# Patient Record
Sex: FEMALE | Race: WHITE | NOT HISPANIC OR LATINO | Employment: FULL TIME | URBAN - METROPOLITAN AREA
[De-identification: names, ages, dates, MRNs, and addresses within clinical notes are randomized per-mention and may not be internally consistent; named-entity substitution may affect disease eponyms.]

---

## 2018-01-17 ENCOUNTER — OFFICE VISIT CONVERTED (OUTPATIENT)
Dept: FAMILY MEDICINE CLINIC | Facility: CLINIC | Age: 38
End: 2018-01-17
Attending: NURSE PRACTITIONER

## 2019-02-04 ENCOUNTER — OFFICE VISIT CONVERTED (OUTPATIENT)
Dept: FAMILY MEDICINE CLINIC | Facility: CLINIC | Age: 39
End: 2019-02-04
Attending: NURSE PRACTITIONER

## 2020-07-01 ENCOUNTER — CONVERSION ENCOUNTER (OUTPATIENT)
Dept: FAMILY MEDICINE CLINIC | Facility: CLINIC | Age: 40
End: 2020-07-01

## 2020-07-01 ENCOUNTER — HOSPITAL ENCOUNTER (OUTPATIENT)
Dept: FAMILY MEDICINE CLINIC | Facility: CLINIC | Age: 40
Discharge: HOME | End: 2020-07-01
Attending: NURSE PRACTITIONER

## 2020-07-01 ENCOUNTER — OFFICE VISIT CONVERTED (OUTPATIENT)
Dept: FAMILY MEDICINE CLINIC | Facility: CLINIC | Age: 40
End: 2020-07-01
Attending: NURSE PRACTITIONER

## 2020-07-01 LAB
25(OH)D3 SERPL-MCNC: 32.1 NG/ML (ref 30–100)
ALBUMIN SERPL-MCNC: 4.6 G/DL (ref 3.5–5)
ALBUMIN/GLOB SERPL: 1.4 {RATIO} (ref 1.4–2.6)
ALP SERPL-CCNC: 60 U/L (ref 42–98)
ALT SERPL-CCNC: 12 U/L (ref 10–40)
ANION GAP SERPL CALC-SCNC: 21 MMOL/L (ref 8–19)
AST SERPL-CCNC: 19 U/L (ref 15–50)
BASOPHILS # BLD AUTO: 0.01 10*3/UL (ref 0–0.2)
BASOPHILS NFR BLD AUTO: 0.1 % (ref 0–3)
BILIRUB SERPL-MCNC: 0.66 MG/DL (ref 0.2–1.3)
BUN SERPL-MCNC: 12 MG/DL (ref 5–25)
BUN/CREAT SERPL: 14 {RATIO} (ref 6–20)
CALCIUM SERPL-MCNC: 9.7 MG/DL (ref 8.7–10.4)
CHLORIDE SERPL-SCNC: 103 MMOL/L (ref 99–111)
CONV ABS IMM GRAN: 0.01 10*3/UL (ref 0–0.2)
CONV CO2: 22 MMOL/L (ref 22–32)
CONV IMMATURE GRAN: 0.1 % (ref 0–1.8)
CONV TOTAL PROTEIN: 7.9 G/DL (ref 6.3–8.2)
CREAT UR-MCNC: 0.84 MG/DL (ref 0.5–0.9)
DEPRECATED RDW RBC AUTO: 44.4 FL (ref 36.4–46.3)
EOSINOPHIL # BLD AUTO: 0.09 10*3/UL (ref 0–0.7)
EOSINOPHIL # BLD AUTO: 1.2 % (ref 0–7)
ERYTHROCYTE [DISTWIDTH] IN BLOOD BY AUTOMATED COUNT: 12.7 % (ref 11.7–14.4)
FERRITIN SERPL-MCNC: 53 NG/ML (ref 10–200)
GFR SERPLBLD BASED ON 1.73 SQ M-ARVRAT: >60 ML/MIN/{1.73_M2}
GLOBULIN UR ELPH-MCNC: 3.3 G/DL (ref 2–3.5)
GLUCOSE SERPL-MCNC: 94 MG/DL (ref 65–99)
HCT VFR BLD AUTO: 43.8 % (ref 37–47)
HGB BLD-MCNC: 14 G/DL (ref 12–16)
IRON SATN MFR SERPL: 29 % (ref 20–55)
IRON SERPL-MCNC: 125 UG/DL (ref 60–170)
LYMPHOCYTES # BLD AUTO: 2.42 10*3/UL (ref 1–5)
LYMPHOCYTES NFR BLD AUTO: 32.9 % (ref 20–45)
MCH RBC QN AUTO: 30.4 PG (ref 27–31)
MCHC RBC AUTO-ENTMCNC: 32 G/DL (ref 33–37)
MCV RBC AUTO: 95 FL (ref 81–99)
MONOCYTES # BLD AUTO: 0.64 10*3/UL (ref 0.2–1.2)
MONOCYTES NFR BLD AUTO: 8.7 % (ref 3–10)
NEUTROPHILS # BLD AUTO: 4.19 10*3/UL (ref 2–8)
NEUTROPHILS NFR BLD AUTO: 57 % (ref 30–85)
NRBC CBCN: 0 % (ref 0–0.7)
OSMOLALITY SERPL CALC.SUM OF ELEC: 292 MOSM/KG (ref 273–304)
PLATELET # BLD AUTO: 308 10*3/UL (ref 130–400)
PMV BLD AUTO: 9.9 FL (ref 9.4–12.3)
POTASSIUM SERPL-SCNC: 4.5 MMOL/L (ref 3.5–5.3)
RBC # BLD AUTO: 4.61 10*6/UL (ref 4.2–5.4)
SODIUM SERPL-SCNC: 141 MMOL/L (ref 135–147)
T4 FREE SERPL-MCNC: 1.4 NG/DL (ref 0.9–1.8)
TIBC SERPL-MCNC: 425 UG/DL (ref 245–450)
TRANSFERRIN SERPL-MCNC: 297 MG/DL (ref 250–380)
TSH SERPL-ACNC: 2.04 M[IU]/L (ref 0.27–4.2)
WBC # BLD AUTO: 7.36 10*3/UL (ref 4.8–10.8)

## 2020-07-02 LAB
CONV ANTI MICROSOMAL AB: 20 IU/ML (ref 0–34)
FOLATE SERPL-MCNC: 15.2 NG/ML (ref 4.8–20)
THYROGLOBULIN ANTIBODY: <1 IU/ML (ref 0–0.9)
VIT B12 SERPL-MCNC: 412 PG/ML (ref 211–911)

## 2020-07-03 LAB
CONV EBV EARLY ANTIGEN: <5 U/ML (ref 0–10.9)
CONV EBV NUCLEAR ANTIGEN: 567 U/ML (ref 0–21.9)
CONV EPSTEIN BARR VIRAL CAPSID IGM: <10 U/ML (ref 0–43.9)
CONV EPSTEIN BARR VIRUS ANTIBODY TO VIRAL CAPSID IGG: 91.9 U/ML (ref 0–21.9)

## 2020-10-29 ENCOUNTER — OFFICE VISIT CONVERTED (OUTPATIENT)
Dept: FAMILY MEDICINE CLINIC | Facility: CLINIC | Age: 40
End: 2020-10-29
Attending: NURSE PRACTITIONER

## 2020-10-29 ENCOUNTER — CONVERSION ENCOUNTER (OUTPATIENT)
Dept: FAMILY MEDICINE CLINIC | Facility: CLINIC | Age: 40
End: 2020-10-29

## 2021-05-09 VITALS
BODY MASS INDEX: 28.09 KG/M2 | WEIGHT: 179 LBS | OXYGEN SATURATION: 98 % | HEIGHT: 67 IN | TEMPERATURE: 97.7 F | DIASTOLIC BLOOD PRESSURE: 80 MMHG | HEART RATE: 112 BPM | SYSTOLIC BLOOD PRESSURE: 120 MMHG

## 2021-05-09 VITALS
DIASTOLIC BLOOD PRESSURE: 72 MMHG | OXYGEN SATURATION: 100 % | WEIGHT: 181.25 LBS | HEART RATE: 87 BPM | BODY MASS INDEX: 28.45 KG/M2 | SYSTOLIC BLOOD PRESSURE: 106 MMHG | HEIGHT: 67 IN | TEMPERATURE: 97.1 F

## 2021-05-09 VITALS
HEART RATE: 80 BPM | OXYGEN SATURATION: 99 % | WEIGHT: 172.5 LBS | BODY MASS INDEX: 27.07 KG/M2 | DIASTOLIC BLOOD PRESSURE: 68 MMHG | TEMPERATURE: 98.4 F | SYSTOLIC BLOOD PRESSURE: 110 MMHG | HEIGHT: 67 IN

## 2021-05-09 VITALS
TEMPERATURE: 98.2 F | HEART RATE: 87 BPM | OXYGEN SATURATION: 98 % | SYSTOLIC BLOOD PRESSURE: 110 MMHG | DIASTOLIC BLOOD PRESSURE: 70 MMHG | BODY MASS INDEX: 28.13 KG/M2 | WEIGHT: 179.25 LBS | HEIGHT: 67 IN

## 2023-02-22 ENCOUNTER — OFFICE VISIT (OUTPATIENT)
Dept: OBSTETRICS AND GYNECOLOGY | Facility: CLINIC | Age: 43
End: 2023-02-22
Payer: COMMERCIAL

## 2023-02-22 VITALS
HEIGHT: 67 IN | HEART RATE: 73 BPM | SYSTOLIC BLOOD PRESSURE: 130 MMHG | WEIGHT: 197 LBS | BODY MASS INDEX: 30.92 KG/M2 | DIASTOLIC BLOOD PRESSURE: 65 MMHG

## 2023-02-22 DIAGNOSIS — Z01.419 ENCOUNTER FOR GYNECOLOGICAL EXAMINATION WITHOUT ABNORMAL FINDING: Primary | ICD-10-CM

## 2023-02-22 PROCEDURE — G0123 SCREEN CERV/VAG THIN LAYER: HCPCS | Performed by: OBSTETRICS & GYNECOLOGY

## 2023-02-22 PROCEDURE — 99386 PREV VISIT NEW AGE 40-64: CPT | Performed by: OBSTETRICS & GYNECOLOGY

## 2023-02-22 NOTE — PROGRESS NOTES
"Well Woman Visit    CC: Annual well woman exam       HPI:   43 y.o. Contraception or HRT: Contraception:  Vasectomy   Menses:   q mon, lasts 6 days, 1 heavy day  Pain:  None  Incontinence concerns: No  Hx of abnormal pap:  No  Pt has no complaints today.      History: PMHx, Meds, Allergies, PSHx, Social Hx, and POBHx all reviewed and updated.      PHYSICAL EXAM:  /65   Pulse 73   Ht 170.2 cm (67\")   Wt 89.4 kg (197 lb)   LMP 2023   BMI 30.85 kg/m²   General- NAD, alert and oriented, appropriate  Psych- Normal mood, good memory  Neck- No masses, no thyroid enlargement  CV- Regular rhythm, no murnurs  Resp- CTA to bases, no wheezes  Abdomen- Soft, non distended, non tender, no masses    Breast left-  Bilaterally symmetrical, no masses, non tender, no nipple discharge  Breast right- Bilaterally symmetrical, no masses, non tender, no nipple discharge    External genitalia- Normal female, no lesions  Urethra/meatus- Normal, no masses, non tender, no prolapse  Bladder- Normal, no masses, non tender, no prolapse  Vagina- Normal, no atrophy, no lesions, no discharge, no prolapse  Cvx- Normal, no lesions, no discharge, No cervical motion tenderness  Uterus- Normal size, shape & consistency.  Non tender, mobile, & no prolapse  Adnexa- No mass, non tender  Anus/Rectum/Perineum- Not performed    Lymphatic- No palpable neck, axillary, or groin nodes  Ext- No edema, no cyanosis    Skin- No lesions, no rashes, no acanthosis nigricans        ASSESSMENT and PLAN:  WWE    Diagnoses and all orders for this visit:    1. Encounter for gynecological examination without abnormal finding (Primary)  -     Mammo Screening Digital Tomosynthesis Bilateral With CAD; Future  -     IGP,rfx Aptima HPV All Pth        Counseling:     Track menses, RTO IF <q21d, >7d long, or heavy    Domestic violence/abuse screen: negative    Depression screen: no SI    Preventative:   BREAST HEALTH- Monthly self breast exam importance and " how to reviewed. MMG and/or MRI (prn) reviewed per society guidelines and her individual history. Mammo/MRI screen: Updated today.  CERVICAL CANCER Screening- Reviewed current ASCCP guidelines for screening w and wo cotest HPV, age specific.  Screen: Updated today.  COLON CANCER Screening- Reviewed current medical society guidelines and options.  Colonoscopy screen:  Not medically needed.  SEXUAL HEALTH: Declines STD screening.  VACCINATIONS Recommended: Flu annually.  Importance discussed, risk being unvaccinated reviewed.  Questions answered  Smoking status- NON SMOKER.  Importance of avoiding second hand smoke.  Follow up PCP/Specialist PMHx and Labs  Myriad: Does not qualify.      She understands the importance of having any ordered tests to be performed in a timely fashion.  She is encouraged to review her results online and/or contact or office if she has questions.     Follow Up:  Return if symptoms worsen or fail to improve.      Mya Mcknight, APRN  02/22/2023

## 2023-03-01 LAB
CONV .: NORMAL
CYTOLOGIST CVX/VAG CYTO: NORMAL
CYTOLOGY CVX/VAG DOC CYTO: NORMAL
CYTOLOGY CVX/VAG DOC THIN PREP: NORMAL
DX ICD CODE: NORMAL
HIV 1 & 2 AB SER-IMP: NORMAL
OTHER STN SPEC: NORMAL
STAT OF ADQ CVX/VAG CYTO-IMP: NORMAL

## 2024-06-27 ENCOUNTER — TELEPHONE (OUTPATIENT)
Dept: OBSTETRICS AND GYNECOLOGY | Facility: CLINIC | Age: 44
End: 2024-06-27

## 2024-06-27 NOTE — TELEPHONE ENCOUNTER
Pt is wanting to see Dr. Cross, bc she has seen her in the past. She became a new pt to dori wright in 2023. I am checking in to this, so once I get an answer I will call pt.

## 2024-06-27 NOTE — TELEPHONE ENCOUNTER
Caller: Latisha Vogt    Relationship: Self    Best call back number: [phone]    Who is your current provider: QASIM LANZA    Is your current provider offboarding? NO    Who would you like your new provider to be:     What are your reasons for transferring care: STATED SHE WAS A PREVIOUS PT OF  AND WOULD LIKE TO SEEN HER AGAIN.     PT WOULD LIKE TO SCHEDULE FOR AN ANNUAL

## 2024-09-02 NOTE — PROGRESS NOTES
"Well Woman Visit    CC: Scheduled annual well gyn visit  Chief Complaint   Patient presents with    Gynecologic Exam       HPI:   44 y.o.   Social History     Substance and Sexual Activity   Sexual Activity Yes    Partners: Male    Birth control/protection: Vasectomy     IGP,rfx Aptima HPV All Pth (2023 14:42) Pap only negative    2nd day of menses really heavy w clots and bad mood swings, vitality provider noted iron was low    Menses:   q mo, lasts 6 days, changes products q 30min to 1hrs on heaviest days.   Pain with menses:  Cramping low back, occas OTC meds needed and helps    Lifting and jumping, leaks urine.  No constant leak.  Urgency and some trickling.   No hesitancy.     PCP: no recent preventative labs  History: PMHx, Meds, Allergies, PSHx, Social Hx, and POBHx all reviewed and updated.    PHYSICAL EXAM:  /76   Pulse 77   Ht 170.2 cm (67\")   Wt 77.1 kg (170 lb)   LMP 2024   BMI 26.63 kg/m²  Not found.   General- NAD, alert and oriented, appropriate  Psych- Normal mood, good memory  Neck- No masses, no thyroid enlargement  CV- Regular rhythm, no murmurs  Resp- CTA to bases, no wheezes  Abdomen- Soft, non distended, non tender, no masses    Chaperone present during breast and/or pelvic exam if performed.  Breast left-  Bilaterally symmetrical, no masses, non tender, no nipple discharge, no axillary or supraclavicular nodes palpable.    Breast right- Bilaterally symmetrical, no masses, non tender, no nipple discharge, no axillary or supraclavicular nodes palpable.      External genitalia- Normal female, no lesions  Urethra/meatus- Normal, no masses, non tender  Bladder- Normal, no masses, non tender  Vagina- Normal, no atrophy, no lesions, no discharge.    Prolapse : none noted, not examined with split speculum to delineate  Cvx- Normal, no lesions, no discharge, No cervical motion tenderness  Uterus- Normal size, shape & consistency.  Non tender, mobile.  Adnexa- No mass, non " tender  Anus/Rectum/Perineum- Normal appearance, no mass, good sphincter tone, no hemorrhoids, no prolapse    Lymphatic- No palpable neck, axillary, or groin nodes  Ext- No edema, no cyanosis    Skin- No lesions, no rashes, no acanthosis nigricans      ASSESSMENT and PLAN:    Diagnoses and all orders for this visit:    1. Well woman exam (Primary)  -     Mammo Screening Digital Tomosynthesis Bilateral With CAD; Future  -     TSH  -     Hemoglobin A1c  -     Lipid Panel    2. Menorrhagia with regular cycle  Assessment & Plan:  We discussed potential differential diagnoses not limited to malignancy.  I would recommend evaluation workup and endometrial biopsy.  Questions answered.    Orders:  -     TSH  -     T4, Free  -     CBC (No Diff)  -     US Non-ob Transvaginal; Future    3. ALEXANDRU (stress urinary incontinence, female)  Assessment & Plan:  We discussed potential treatment options for stress urinary incontinence to include home Kegel exercises, pelvic floor physical therapy, pessary, urogynecology referral.  We also reviewed over-the-counter options like impress.  I reviewed the pessary traded day.  Recommend Kegel exercises and consider pelvic floor physical therapy.  Option for pessary fitting.  She will call if she desires.    Orders:  -     Urine Culture - Urine, Urine, Clean Catch  -     Urinalysis With Microscopic - Urine, Clean Catch    4. Urinary urgency  -     Urine Culture - Urine, Urine, Clean Catch  -     Urinalysis With Microscopic - Urine, Clean Catch  -     tolterodine LA (Detrol LA) 2 MG 24 hr capsule; Take 1 capsule by mouth Daily.  Dispense: 30 capsule; Refill: 12    5. Colon cancer screening  -     Ambulatory Referral to Gastroenterology      Preventative:  BREAST HEALTH- Monthly self breast exam importance and how to reviewed. MMG and/or MRI (prn) reviewed per society guidelines and her individual history. Screen: Updated today  CERVICAL CANCER Screening- Reviewed current ASCCP guidelines for  screening w and wo cotest HPV, age specific.  Screen: Already up to date  COLON CANCER Screening- Reviewed current medical society guidelines and options.  Screen:  Updated today  Importance of WEIGHT MANAGEMENT, nutrition, and exercise reviewed.  Ideal BMI discussed  BONE HEALTH- Reviewed current medical society guidelines and options for testing, calcium and vit D intake.  Weight bearing exercise.  DEXA: Not medically needed  VACCINATIONS Recommended: Covid vaccine, Flu annually.  Importance discussed, risk being unvaccinated reviewed.  Questions answered  Smoking status- NON SMOKER/VAPER          She understands the importance of having any ordered tests to be performed in a timely fashion.  The risks of not performing them include, but are not limited to, advanced cancer stages, bone loss from osteoporosis and/or subsequent increase in morbidity and/or mortality.  She is encouraged to review her results online and/or contact or office if she has questions.     Follow Up:  Return in about 4 weeks (around 10/1/2024) for FU US for EMBx.            Ceci Cross,   09/03/2024    [facility]  [facility] [place] [address]  Dept: [phone]  Dept Fax: [phone]  Loc: [phone]  Loc Fax: [phone]

## 2024-09-03 ENCOUNTER — OFFICE VISIT (OUTPATIENT)
Dept: OBSTETRICS AND GYNECOLOGY | Facility: CLINIC | Age: 44
End: 2024-09-03
Payer: OTHER GOVERNMENT

## 2024-09-03 VITALS
HEIGHT: 67 IN | SYSTOLIC BLOOD PRESSURE: 108 MMHG | WEIGHT: 170 LBS | BODY MASS INDEX: 26.68 KG/M2 | HEART RATE: 77 BPM | DIASTOLIC BLOOD PRESSURE: 76 MMHG

## 2024-09-03 DIAGNOSIS — Z12.11 COLON CANCER SCREENING: ICD-10-CM

## 2024-09-03 DIAGNOSIS — N92.0 MENORRHAGIA WITH REGULAR CYCLE: ICD-10-CM

## 2024-09-03 DIAGNOSIS — R39.15 URINARY URGENCY: ICD-10-CM

## 2024-09-03 DIAGNOSIS — Z01.419 WELL WOMAN EXAM: Primary | ICD-10-CM

## 2024-09-03 DIAGNOSIS — N39.3 SUI (STRESS URINARY INCONTINENCE, FEMALE): ICD-10-CM

## 2024-09-03 LAB
BACTERIA UR QL AUTO: ABNORMAL /HPF
BILIRUB UR QL STRIP: NEGATIVE
CHOLEST SERPL-MCNC: 195 MG/DL (ref 0–200)
CLARITY UR: CLEAR
COLOR UR: YELLOW
DEPRECATED RDW RBC AUTO: 41.7 FL (ref 37–54)
ERYTHROCYTE [DISTWIDTH] IN BLOOD BY AUTOMATED COUNT: 12.1 % (ref 12.3–15.4)
GLUCOSE UR STRIP-MCNC: NEGATIVE MG/DL
HBA1C MFR BLD: 5.2 % (ref 4.8–5.6)
HCT VFR BLD AUTO: 43.4 % (ref 34–46.6)
HDLC SERPL-MCNC: 52 MG/DL (ref 40–60)
HGB BLD-MCNC: 14.5 G/DL (ref 12–15.9)
HGB UR QL STRIP.AUTO: NEGATIVE
HYALINE CASTS UR QL AUTO: ABNORMAL /LPF
KETONES UR QL STRIP: NEGATIVE
LDLC SERPL CALC-MCNC: 124 MG/DL (ref 0–100)
LDLC/HDLC SERPL: 2.33 {RATIO}
LEUKOCYTE ESTERASE UR QL STRIP.AUTO: ABNORMAL
MCH RBC QN AUTO: 31.7 PG (ref 26.6–33)
MCHC RBC AUTO-ENTMCNC: 33.4 G/DL (ref 31.5–35.7)
MCV RBC AUTO: 95 FL (ref 79–97)
NITRITE UR QL STRIP: POSITIVE
PH UR STRIP.AUTO: 6 [PH] (ref 5–8)
PLATELET # BLD AUTO: 330 10*3/MM3 (ref 140–450)
PMV BLD AUTO: 9.4 FL (ref 6–12)
PROT UR QL STRIP: NEGATIVE
RBC # BLD AUTO: 4.57 10*6/MM3 (ref 3.77–5.28)
RBC # UR STRIP: ABNORMAL /HPF
REF LAB TEST METHOD: ABNORMAL
SP GR UR STRIP: 1.01 (ref 1–1.03)
SQUAMOUS #/AREA URNS HPF: ABNORMAL /HPF
T4 FREE SERPL-MCNC: 1.52 NG/DL (ref 0.92–1.68)
TRIGL SERPL-MCNC: 108 MG/DL (ref 0–150)
TSH SERPL DL<=0.05 MIU/L-ACNC: 2.22 UIU/ML (ref 0.27–4.2)
UROBILINOGEN UR QL STRIP: ABNORMAL
VLDLC SERPL-MCNC: 19 MG/DL (ref 5–40)
WBC # UR STRIP: ABNORMAL /HPF
WBC NRBC COR # BLD AUTO: 8.77 10*3/MM3 (ref 3.4–10.8)

## 2024-09-03 PROCEDURE — 83036 HEMOGLOBIN GLYCOSYLATED A1C: CPT | Performed by: OBSTETRICS & GYNECOLOGY

## 2024-09-03 PROCEDURE — 84443 ASSAY THYROID STIM HORMONE: CPT | Performed by: OBSTETRICS & GYNECOLOGY

## 2024-09-03 PROCEDURE — 84439 ASSAY OF FREE THYROXINE: CPT | Performed by: OBSTETRICS & GYNECOLOGY

## 2024-09-03 PROCEDURE — 81001 URINALYSIS AUTO W/SCOPE: CPT | Performed by: OBSTETRICS & GYNECOLOGY

## 2024-09-03 PROCEDURE — 80061 LIPID PANEL: CPT | Performed by: OBSTETRICS & GYNECOLOGY

## 2024-09-03 PROCEDURE — 99396 PREV VISIT EST AGE 40-64: CPT | Performed by: OBSTETRICS & GYNECOLOGY

## 2024-09-03 PROCEDURE — 87088 URINE BACTERIA CULTURE: CPT | Performed by: OBSTETRICS & GYNECOLOGY

## 2024-09-03 PROCEDURE — 87086 URINE CULTURE/COLONY COUNT: CPT | Performed by: OBSTETRICS & GYNECOLOGY

## 2024-09-03 PROCEDURE — 85027 COMPLETE CBC AUTOMATED: CPT | Performed by: OBSTETRICS & GYNECOLOGY

## 2024-09-03 PROCEDURE — 99214 OFFICE O/P EST MOD 30 MIN: CPT | Performed by: OBSTETRICS & GYNECOLOGY

## 2024-09-03 PROCEDURE — 36415 COLL VENOUS BLD VENIPUNCTURE: CPT | Performed by: OBSTETRICS & GYNECOLOGY

## 2024-09-03 PROCEDURE — 87186 SC STD MICRODIL/AGAR DIL: CPT | Performed by: OBSTETRICS & GYNECOLOGY

## 2024-09-03 RX ORDER — SEMAGLUTIDE 2.4 MG/.75ML
INJECTION, SOLUTION SUBCUTANEOUS
COMMUNITY
Start: 2024-08-31

## 2024-09-03 RX ORDER — TOLTERODINE 2 MG/1
2 CAPSULE, EXTENDED RELEASE ORAL DAILY
Qty: 30 CAPSULE | Refills: 12 | Status: SHIPPED | OUTPATIENT
Start: 2024-09-03

## 2024-09-03 NOTE — ASSESSMENT & PLAN NOTE
We discussed potential treatment options for stress urinary incontinence to include home Kegel exercises, pelvic floor physical therapy, pessary, urogynecology referral.  We also reviewed over-the-counter options like impress.  I reviewed the pessary traded day.  Recommend Kegel exercises and consider pelvic floor physical therapy.  Option for pessary fitting.  She will call if she desires.

## 2024-09-03 NOTE — ASSESSMENT & PLAN NOTE
We discussed potential differential diagnoses not limited to malignancy.  I would recommend evaluation workup and endometrial biopsy.  Questions answered.

## 2024-09-03 NOTE — PATIENT INSTRUCTIONS
Venipuncture Blood Specimen Collection  Venipuncture performed in right arm by Etta Casas with good hemostasis. Patient tolerated the procedure well without complications.   09/03/24   Etta Casas

## 2024-09-04 ENCOUNTER — TELEPHONE (OUTPATIENT)
Dept: OBSTETRICS AND GYNECOLOGY | Facility: CLINIC | Age: 44
End: 2024-09-04
Payer: OTHER GOVERNMENT

## 2024-09-04 NOTE — TELEPHONE ENCOUNTER
LM for patient to return call to discuss results.    ----- Message from Ceci Cross sent at 9/4/2024  8:49 AM EDT -----  Abnormal lipid panel.  Mildly elevated bad cholesterol (LDL).  Non fasting status only affects triglycerides.  I recommend exercise and continue to maintain normal weight, low fat and low carb diet.  We can re- check year.       Urine shows possible UTI.   Culture will be resulted in the next 1-2days and we will contact her if it confirms UTI.

## 2024-09-05 LAB — BACTERIA SPEC AEROBE CULT: ABNORMAL

## 2024-09-05 RX ORDER — NITROFURANTOIN 25; 75 MG/1; MG/1
100 CAPSULE ORAL 2 TIMES DAILY
Qty: 14 CAPSULE | Refills: 0 | Status: SHIPPED | OUTPATIENT
Start: 2024-09-05

## 2024-09-06 ENCOUNTER — TELEPHONE (OUTPATIENT)
Dept: OBSTETRICS AND GYNECOLOGY | Facility: CLINIC | Age: 44
End: 2024-09-06
Payer: OTHER GOVERNMENT

## 2024-09-06 NOTE — TELEPHONE ENCOUNTER
----- Message from Ceci Cross sent at 9/5/2024  4:58 PM EDT -----  Urine culture is consistent with infection.  I have sent in a prescription for Macrobid.  Sometimes infection can cause urinary urgency and leakage.  Lets treat the infection first and if she still has the urinary leakage and urgency she can start the Detrol as I prescribed at last office visit.  Have her start antibiotics as soon as she picks them up and finish the entire course.

## 2024-10-02 ENCOUNTER — PREP FOR SURGERY (OUTPATIENT)
Dept: OTHER | Facility: HOSPITAL | Age: 44
End: 2024-10-02
Payer: OTHER GOVERNMENT

## 2024-10-02 ENCOUNTER — CLINICAL SUPPORT (OUTPATIENT)
Dept: GASTROENTEROLOGY | Facility: CLINIC | Age: 44
End: 2024-10-02
Payer: OTHER GOVERNMENT

## 2024-10-02 DIAGNOSIS — Z12.11 COLON CANCER SCREENING: Primary | ICD-10-CM

## 2024-10-02 RX ORDER — SODIUM PICOSULFATE, MAGNESIUM OXIDE, AND ANHYDROUS CITRIC ACID 12; 3.5; 1 G/175ML; G/175ML; MG/175ML
175 LIQUID ORAL TAKE AS DIRECTED
Qty: 350 ML | Refills: 0 | Status: SHIPPED | OUTPATIENT
Start: 2024-10-02

## 2024-10-02 NOTE — PROGRESS NOTES
Latisha Vogt  1980  44 y.o.    Reason for call: Screening Colonoscopy  Prep prescribed: Clenpiq  Prep instructions reviewed with patient and sent to patient via regular mail to the home address on file  Is the patient currently on any injectable or oral medications for weight loss or diabetes? Yes  Clearance needed? No  If yes, what clearance is needed? N/A  Clearance has been requested from N/A  The patient has been scheduled for: Colonoscopy  After your procedure, you will be contacted with results. Please confirm the best phone # to reach the patient: [phone]  Family history of colon cancer? Yes  If yes, indicate relative: GRANDMOTHER  Tentative Procedure Date: 12/30/2024    Family History   Problem Relation Age of Onset    Deep vein thrombosis Paternal Grandmother     Colon cancer Paternal Grandmother     Breast cancer Neg Hx     Ovarian cancer Neg Hx     Uterine cancer Neg Hx     Melanoma Neg Hx     Pulmonary embolism Neg Hx      Past Medical History:   Diagnosis Date    Abnormal Pap smear of cervix     all normal at [facility] since 2013.  Pt unsure as to when had abn pap.    Disease of thyroid gland     Urogenital trichomoniasis      No Known Allergies  History reviewed. No pertinent surgical history.  Social History     Socioeconomic History    Marital status:    Tobacco Use    Smoking status: Never     Passive exposure: Never    Smokeless tobacco: Never   Vaping Use    Vaping status: Never Used   Substance and Sexual Activity    Alcohol use: Not Currently     Comment: SOCIALLY    Drug use: Never    Sexual activity: Yes     Partners: Male     Birth control/protection: Vasectomy       Current Outpatient Medications:     tolterodine LA (Detrol LA) 2 MG 24 hr capsule, Take 1 capsule by mouth Daily., Disp: 30 capsule, Rfl: 12    Wegovy 2.4 MG/0.75ML solution auto-injector, , Disp: , Rfl:     nitrofurantoin, macrocrystal-monohydrate, (Macrobid) 100 MG capsule, Take 1 capsule by mouth 2 (Two) Times a  Day. (Patient not taking: Reported on 10/2/2024), Disp: 14 capsule, Rfl: 0

## 2024-12-19 NOTE — PRE-PROCEDURE INSTRUCTIONS
"PAT call attempted. No answer.Detailed message with date and arrival time of 0630 given. Come to entrance \"C\", must have adult  for transportation home;may have two visitors;however, children under 12 must remain in waiting area.Instructed on diet/clear liquids/NPO bowel prep,if needed may take normal meds two hours prior to arrival time except for blood thinners, antidiabetics,diurectics,and weight loss meds.Instructed to return call to confirm receipt of instructions and for any questions.  "

## 2024-12-23 ENCOUNTER — OFFICE VISIT (OUTPATIENT)
Dept: FAMILY MEDICINE CLINIC | Facility: CLINIC | Age: 44
End: 2024-12-23
Payer: OTHER GOVERNMENT

## 2024-12-23 VITALS
HEIGHT: 67 IN | HEART RATE: 105 BPM | BODY MASS INDEX: 27.56 KG/M2 | DIASTOLIC BLOOD PRESSURE: 78 MMHG | TEMPERATURE: 98.1 F | SYSTOLIC BLOOD PRESSURE: 104 MMHG | OXYGEN SATURATION: 100 % | WEIGHT: 175.6 LBS

## 2024-12-23 DIAGNOSIS — E66.3 OVERWEIGHT (BMI 25.0-29.9): Primary | ICD-10-CM

## 2024-12-23 NOTE — PROGRESS NOTES
Chief Complaint  Establish Care (Weight loss )    PHQ-2 Total Score:    PHQ-9 Total Score:      History of Present Illness  Latisha Vogt is a 44 y.o. female who presents to [facility] with a past medical history of  Past Medical History:   Diagnosis Date    Abnormal Pap smear of cervix     all normal at [facility] since 2013.  Pt unsure as to when had abn pap.    Disease of thyroid gland     Urogenital trichomoniasis        HPI     The patient is a 44-year-old female who presents for weight management.    She has been on Wegovy since 06/01/2023, resulting in a weight loss of approximately 30 pounds, equivalent to 15 percent of her initial weight. However, her weight loss plateaued at around 167 pounds for several months. Over the past 1.5 months, she has experienced a weight gain of at least 5 pounds, which she attributes to an increased appetite and lack of satiety after meals. She suspects that her body may have developed a tolerance to Wegovy. Despite exercising three times a week, she acknowledges a deficiency in her protein intake, consuming only about 60 grams per day instead of the recommended 100 to 120 grams. She does not monitor her protein intake using any tracking apps. She has noticed a decrease in her strength during weightlifting sessions, although she still considers her lifting capacity to be satisfactory. She has no history of thyroid cancer or gastroparesis. A previous attempt to switch her medication to Zepbound was unsuccessful due to a denial of prior authorization, as her BMI was below 30.    She is scheduled for a colonoscopy next Monday, as recommended by her OB-GYN due to her age. She has never undergone a mammogram.    Depression screening was negative.    MEDICATIONS  Current: Wegovy       Objective   Vital Signs:   Vitals:    12/23/24 1434   BP: 104/78   BP Location: Left arm   Patient Position: Sitting   Pulse: 105   Temp: 98.1 °F (36.7 °C)   SpO2: 100%  "  Weight: 79.7 kg (175 lb 9.6 oz)   Height: 168.9 cm (66.5\")     Body mass index is 27.92 kg/m².    Wt Readings from Last 3 Encounters:   12/23/24 79.7 kg (175 lb 9.6 oz)   09/03/24 77.1 kg (170 lb)   02/22/23 89.4 kg (197 lb)     BP Readings from Last 3 Encounters:   12/23/24 104/78   09/03/24 108/76   02/22/23 130/65       Health Maintenance   Topic Date Due    MAMMOGRAM  Never done    HEPATITIS C SCREENING  Never done    ANNUAL PHYSICAL  Never done    INFLUENZA VACCINE  03/31/2025 (Originally 7/1/2024)    COVID-19 Vaccine (3 - 2024-25 season) 12/20/2025 (Originally 9/1/2024)    BMI FOLLOWUP  08/31/2025    TDAP/TD VACCINES (2 - Td or Tdap) 10/21/2025    PAP SMEAR  02/22/2026    Pneumococcal Vaccine 0-64  Aged Out       Physical Exam  Vitals reviewed.   Constitutional:       General: She is not in acute distress.     Appearance: Normal appearance. She is well-developed.   HENT:      Head: Normocephalic and atraumatic.      Right Ear: External ear normal.      Left Ear: External ear normal.   Eyes:      Conjunctiva/sclera: Conjunctivae normal.      Pupils: Pupils are equal, round, and reactive to light.   Cardiovascular:      Rate and Rhythm: Normal rate and regular rhythm.      Heart sounds: Normal heart sounds.   Pulmonary:      Effort: Pulmonary effort is normal.      Breath sounds: Normal breath sounds.   Musculoskeletal:      Cervical back: Neck supple.      Right lower leg: No edema.      Left lower leg: No edema.   Skin:     General: Skin is warm and dry.   Neurological:      Mental Status: She is alert and oriented to person, place, and time.   Psychiatric:         Mood and Affect: Mood and affect normal.         Behavior: Behavior normal.         Thought Content: Thought content normal.         Judgment: Judgment normal.            Result Review :    Results           Procedures        Assessment and Plan   Diagnoses and all orders for this visit:    1. Overweight (BMI 25.0-29.9) (Primary)  -     " Tirzepatide-Weight Management (ZEPBOUND) 2.5 MG/0.5ML solution auto-injector; Inject 0.5 mL under the skin into the appropriate area as directed 1 (One) Time Per Week.  Dispense: 2 mL; Refill: 1         1. Weight management.  She has been on Wegovy since 06/01/2023 and lost about 30 pounds, approximately 15% of her body weight. However, she has recently gained 5 pounds and feels her body has plateaued on the medication. She will transition to Zepbound, starting with a dose of 2.5 mg once weekly, to be initiated post-colonoscopy. She is advised to monitor her protein intake, aiming for a daily consumption of 120 to 140 grams. Increasing protein intake is important to prevent muscle loss, which can negatively impact metabolism. She is encouraged to continue her current exercise routine of lifting weights three times a week.    2. Health maintenance.  She is scheduled for a colonoscopy next Monday, as recommended by her OB-GYN due to her age. She has never had a mammogram and is advised to schedule one, preferably at a facility covered by her insurance.    Follow-up  The patient will follow up in 1 month.               FOLLOW UP  Return in about 4 weeks (around 1/20/2025) for Recheck, Refill.    Patient was given instructions and counseling regarding her condition or for health maintenance advice. Please see specific information pulled into the AVS if appropriate.       Shanthi KIRIT Elkins, APRN  12/23/24  15:46 EST    CURRENT & DISCONTINUED MEDICATIONS  Current Outpatient Medications   Medication Instructions    Tirzepatide-Weight Management (ZEPBOUND) 2.5 mg, Subcutaneous, Weekly       Medications Discontinued During This Encounter   Medication Reason    tolterodine LA (Detrol LA) 2 MG 24 hr capsule *Therapy completed    nitrofurantoin, macrocrystal-monohydrate, (Macrobid) 100 MG capsule *Therapy completed    Sod Picosulfate-Mag Ox-Cit Acd (Clenpiq) 10-3.5-12 MG-GM -GM/175ML solution *Therapy completed    Wegovy 2.4  MG/0.75ML solution auto-injector Alternate therapy        Patient or patient representative verbalized consent for the use of Ambient Listening during the visit with  QASIM Jain for chart documentation. 12/23/2024  15:46 EST

## 2024-12-30 ENCOUNTER — HOSPITAL ENCOUNTER (OUTPATIENT)
Facility: HOSPITAL | Age: 44
Setting detail: HOSPITAL OUTPATIENT SURGERY
Discharge: HOME | End: 2024-12-30
Attending: INTERNAL MEDICINE | Admitting: INTERNAL MEDICINE
Payer: OTHER GOVERNMENT

## 2024-12-30 VITALS
HEART RATE: 75 BPM | OXYGEN SATURATION: 98 % | TEMPERATURE: 98.5 F | DIASTOLIC BLOOD PRESSURE: 82 MMHG | WEIGHT: 177.69 LBS | RESPIRATION RATE: 16 BRPM | SYSTOLIC BLOOD PRESSURE: 124 MMHG | BODY MASS INDEX: 28.25 KG/M2

## 2024-12-30 PROCEDURE — G0463 HOSPITAL OUTPT CLINIC VISIT: HCPCS | Performed by: INTERNAL MEDICINE

## 2025-01-08 ENCOUNTER — TELEPHONE (OUTPATIENT)
Dept: GASTROENTEROLOGY | Facility: CLINIC | Age: 45
End: 2025-01-08
Payer: OTHER GOVERNMENT

## 2025-01-08 NOTE — TELEPHONE ENCOUNTER
Received an email from PAT- patient not prepped.     Attempted to contact patient. No answer, LVM for patient to contact the office.

## 2025-01-13 NOTE — TELEPHONE ENCOUNTER
Spoke with pt. 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  I have pt scheduled for another MA screening phone appt In may to update H&P, then we can schedule her c'scope for the summer.  ayden

## 2025-01-20 ENCOUNTER — OFFICE VISIT (OUTPATIENT)
Dept: FAMILY MEDICINE CLINIC | Facility: CLINIC | Age: 45
End: 2025-01-20
Payer: OTHER GOVERNMENT

## 2025-01-20 VITALS
WEIGHT: 181.1 LBS | BODY MASS INDEX: 28.43 KG/M2 | HEIGHT: 67 IN | OXYGEN SATURATION: 99 % | HEART RATE: 97 BPM | TEMPERATURE: 98.2 F | DIASTOLIC BLOOD PRESSURE: 78 MMHG | SYSTOLIC BLOOD PRESSURE: 126 MMHG

## 2025-01-20 DIAGNOSIS — E66.3 OVERWEIGHT (BMI 25.0-29.9): ICD-10-CM

## 2025-01-20 PROCEDURE — 99213 OFFICE O/P EST LOW 20 MIN: CPT | Performed by: NURSE PRACTITIONER

## 2025-01-20 NOTE — PROGRESS NOTES
Chief Complaint  Weight Check (1 month follow up for zepbound )    PHQ-2 Total Score:    PHQ-9 Total Score:      History of Present Illness  Latisha Vogt is a 44 y.o. female who presents to [facility] with a past medical history of  Past Medical History:   Diagnosis Date    Abnormal Pap smear of cervix     all normal at [facility] since 2013.  Pt unsure as to when had abn pap.    Disease of thyroid gland     Urogenital trichomoniasis        HPI     The patient is a 44-year-old female who presents for a follow-up on Zepbound.    She reports that the medication has not been effective in managing her weight. She initiated the treatment on 01/03/2025, weighing 177 pounds, and experienced a weight gain of 3 pounds within a week, reaching 180 pounds after the second injection. Following the third injection, administered on a Friday, she noted an additional weight gain of 3 pounds. She also reports an increase in food cravings and a lack of satiety during meals. Prior to starting Zepbound, she was on the highest dose of Wegovy. She expresses a desire to increase the dosage of Zepbound. She has not experienced any side effects from either medication, except for sulfur burps when consuming high-fat foods while on Wegovy. She has been on Wegovy for over a year and describes the experience as challenging. She identifies stress as a potential contributing factor to her eating habits. She typically consumes an ice cream bar as a late-night snack. She has recently purchased protein powder and is making efforts to increase her protein intake, currently consuming between 90 to 100 grams daily. She does not eat breakfast but plans to incorporate a protein shake into her morning routine. She drinks coffee once a day and adds collagen to it. She has attempted fasting but finds it difficult due to her early lunch schedule at work.    She was supposed to be off of Zepbound for a week for the colonoscopy,  "which she did not get. She did not get the colonoscopy because they gave her the one where she drinks the 2 small bottles 4 hours apart and nothing happened. She had 3 bowel movements, but they were not watery. She went that morning and told them she only had 3 bowel movements and they sent her home and she is going to do it in the summertime because she does not want to take off work again for it.    MEDICATIONS  Current: Zepbound, Wegovy       Objective   Vital Signs:   Vitals:    01/20/25 1325   BP: 126/78   BP Location: Left arm   Patient Position: Sitting   Pulse: 97   Temp: 98.2 °F (36.8 °C)   SpO2: 99%   Weight: 82.1 kg (181 lb 1.6 oz)   Height: 168.9 cm (66.5\")   PainSc: 0-No pain     Body mass index is 28.79 kg/m².    Wt Readings from Last 3 Encounters:   01/20/25 82.1 kg (181 lb 1.6 oz)   12/30/24 80.6 kg (177 lb 11.1 oz)   12/23/24 79.7 kg (175 lb 9.6 oz)     BP Readings from Last 3 Encounters:   01/20/25 126/78   12/30/24 124/82   12/23/24 104/78       Health Maintenance   Topic Date Due    MAMMOGRAM  Never done    HEPATITIS C SCREENING  Never done    ANNUAL PHYSICAL  Never done    INFLUENZA VACCINE  03/31/2025 (Originally 7/1/2024)    COVID-19 Vaccine (3 - 2024-25 season) 12/20/2025 (Originally 9/1/2024)    TDAP/TD VACCINES (2 - Td or Tdap) 10/21/2025    BMI FOLLOWUP  12/23/2025    PAP SMEAR  02/22/2026    Pneumococcal Vaccine 0-64  Aged Out       Physical Exam  Vitals reviewed.   Constitutional:       General: She is not in acute distress.     Appearance: Normal appearance. She is well-developed.   HENT:      Head: Normocephalic and atraumatic.      Right Ear: External ear normal.      Left Ear: External ear normal.   Eyes:      Conjunctiva/sclera: Conjunctivae normal.      Pupils: Pupils are equal, round, and reactive to light.   Musculoskeletal:      Right lower leg: No edema.      Left lower leg: No edema.   Skin:     General: Skin is warm and dry.   Neurological:      Mental Status: She is alert " and oriented to person, place, and time.   Psychiatric:         Mood and Affect: Mood and affect normal.         Behavior: Behavior normal.         Thought Content: Thought content normal.         Judgment: Judgment normal.            Result Review :    Results           Procedures        Assessment and Plan   Diagnoses and all orders for this visit:    1. Overweight (BMI 25.0-29.9)  -     Tirzepatide-Weight Management (ZEPBOUND) 7.5 MG/0.5ML solution auto-injector; Inject 0.5 mL under the skin into the appropriate area as directed 1 (One) Time Per Week.  Dispense: 2 mL; Refill: 0         1. Weight management.  She has been experiencing weight gain despite taking Zepbound. She is advised to continue focusing on protein intake and participate in a nutrition challenge that emphasizes protein consumption. The use of protein powder mixed with sugar-free pudding mix is suggested to help curb sweet cravings. She is also encouraged to explore the [employer] website for high-protein recipes. The dosage of Zepbound will be increased to 7.5 mg to enhance its effectiveness.    2. Incomplete colonoscopy preparation.  She was unable to complete her colonoscopy due to ineffective bowel preparation. She plans to reschedule the procedure for the summer and will need a different preparation method.    Follow-up  The patient will follow up in 1 month.               FOLLOW UP  Return in about 4 weeks (around 2/17/2025) for Recheck.    Patient was given instructions and counseling regarding her condition or for health maintenance advice. Please see specific information pulled into the AVS if appropriate.       Shanthi Elkins, APRN  01/20/25  14:25 EST    CURRENT & DISCONTINUED MEDICATIONS  Current Outpatient Medications   Medication Instructions    Tirzepatide-Weight Management (ZEPBOUND) 7.5 mg, Subcutaneous, Weekly       Medications Discontinued During This Encounter   Medication Reason    Tirzepatide-Weight Management  (ZEPBOUND) 2.5 MG/0.5ML solution auto-injector         Patient or patient representative verbalized consent for the use of Ambient Listening during the visit with  QASIM Jain for chart documentation. 1/20/2025  14:25 EST

## 2025-01-24 ENCOUNTER — PATIENT MESSAGE (OUTPATIENT)
Dept: OBSTETRICS AND GYNECOLOGY | Facility: CLINIC | Age: 45
End: 2025-01-24

## 2025-02-17 ENCOUNTER — OFFICE VISIT (OUTPATIENT)
Dept: FAMILY MEDICINE CLINIC | Facility: CLINIC | Age: 45
End: 2025-02-17
Payer: OTHER GOVERNMENT

## 2025-02-17 VITALS
WEIGHT: 175 LBS | HEIGHT: 67 IN | DIASTOLIC BLOOD PRESSURE: 70 MMHG | SYSTOLIC BLOOD PRESSURE: 116 MMHG | HEART RATE: 98 BPM | OXYGEN SATURATION: 99 % | TEMPERATURE: 97.7 F | BODY MASS INDEX: 27.47 KG/M2

## 2025-02-17 DIAGNOSIS — E66.3 OVERWEIGHT (BMI 25.0-29.9): ICD-10-CM

## 2025-02-17 NOTE — PROGRESS NOTES
"Chief Complaint  Obesity (Medication follow up )    The PHQ has not been completed during this encounter.       Weight Management  Weight:  Decreased  Weight change (lbs):  8  Treatment barriers:  None  Physical activity tolerance:  Stable  Energy level:  Unchanged    aLtisha Vogt is a 45 y.o. female who presents to [facility] with a past medical history of  Past Medical History:   Diagnosis Date    Abnormal Pap smear of cervix     all normal at [facility] since 2013.  Pt unsure as to when had abn pap.    Disease of thyroid gland     Urogenital trichomoniasis        HPI     The patient is a 45-year-old female here to follow up on weight management.    She reports a decrease in appetite, which she attributes to an increased dosage of her medication. She does not experience any food cravings or aversions. She has been adhering to a high-protein diet and has not engaged in Cross[employer] exercises due to a recent illness characterized by fever and chills. She is not experiencing any constipation or other side effects from her medication, except for a loss of appetite.       Objective   Vital Signs:   Vitals:    02/17/25 1521   BP: 116/70   Pulse: 98   Temp: 97.7 °F (36.5 °C)   SpO2: 99%   Weight: 79.4 kg (175 lb)   Height: 168.9 cm (66.5\")     Body mass index is 27.82 kg/m².    Wt Readings from Last 3 Encounters:   02/17/25 79.4 kg (175 lb)   01/20/25 82.1 kg (181 lb 1.6 oz)   12/30/24 80.6 kg (177 lb 11.1 oz)     BP Readings from Last 3 Encounters:   02/17/25 116/70   01/20/25 126/78   12/30/24 124/82       Health Maintenance   Topic Date Due    COLORECTAL CANCER SCREENING  Never done    MAMMOGRAM  Never done    HEPATITIS C SCREENING  Never done    ANNUAL PHYSICAL  Never done    INFLUENZA VACCINE  03/31/2025 (Originally 7/1/2024)    COVID-19 Vaccine (3 - 2024-25 season) 12/20/2025 (Originally 9/1/2024)    TDAP/TD VACCINES (2 - Td or Tdap) 10/21/2025    BMI FOLLOWUP  01/20/2026    PAP SMEAR  " 02/22/2026    Pneumococcal Vaccine 0-49  Aged Out       Physical Exam  Vitals reviewed.   Constitutional:       General: She is not in acute distress.     Appearance: Normal appearance. She is well-developed.   Eyes:      Conjunctiva/sclera: Conjunctivae normal.      Pupils: Pupils are equal, round, and reactive to light.   Cardiovascular:      Rate and Rhythm: Normal rate and regular rhythm.      Heart sounds: Normal heart sounds.   Pulmonary:      Effort: Pulmonary effort is normal.      Breath sounds: Normal breath sounds.   Skin:     General: Skin is warm and dry.   Neurological:      Mental Status: She is alert and oriented to person, place, and time.   Psychiatric:         Mood and Affect: Mood and affect normal.         Behavior: Behavior normal.         Thought Content: Thought content normal.         Judgment: Judgment normal.            Result Review :  The following data was reviewed by: QASIM Jain on 02/17/2025:  Results       Common labs          9/3/2024    10:11   Common Labs   WBC 8.77    Hemoglobin 14.5    Hematocrit 43.4    Platelets 330    Total Cholesterol 195    Triglycerides 108    HDL Cholesterol 52    LDL Cholesterol  124    Hemoglobin A1C 5.20      TSH          9/3/2024    10:11   TSH   TSH 2.220        Procedures        Assessment and Plan   Diagnoses and all orders for this visit:    1. Overweight (BMI 25.0-29.9)  -     Tirzepatide-Weight Management (ZEPBOUND) 7.5 MG/0.5ML solution auto-injector; Inject 0.5 mL under the skin into the appropriate area as directed 1 (One) Time Per Week.  Dispense: 2 mL; Refill: 1         1. Weight management.  She has experienced a weight loss of 8 pounds. She reports decreased appetite and no cravings, which are expected effects of the medication. She is advised to maintain a high-protein diet and gradually resume her exercise regimen, particularly if she experienced coughing during her recent flu-like illness, as it may take some time to regain  her cardiovascular endurance. The current dosage of her medication will be maintained at 7.5 mg, and a prescription will be sent to [facility] pharmacy. If there are any changes in her condition, she should inform the clinic.    Follow-up  The patient will follow up in 1 month.               FOLLOW UP  Return in about 4 weeks (around 3/17/2025) for Recheck.    Patient was given instructions and counseling regarding her condition or for health maintenance advice. Please see specific information pulled into the AVS if appropriate.       QASIM Jain  02/17/25  15:43 EST    CURRENT & DISCONTINUED MEDICATIONS  Current Outpatient Medications   Medication Instructions    Tirzepatide-Weight Management (ZEPBOUND) 7.5 mg, Subcutaneous, Weekly       Medications Discontinued During This Encounter   Medication Reason    Tirzepatide-Weight Management (ZEPBOUND) 7.5 MG/0.5ML solution auto-injector Reorder        Patient or patient representative verbalized consent for the use of Ambient Listening during the visit with  QASIM Jain for chart documentation. 2/17/2025  15:31 EST

## 2025-03-17 ENCOUNTER — OFFICE VISIT (OUTPATIENT)
Dept: FAMILY MEDICINE CLINIC | Facility: CLINIC | Age: 45
End: 2025-03-17
Payer: OTHER GOVERNMENT

## 2025-03-17 VITALS
BODY MASS INDEX: 27.44 KG/M2 | HEART RATE: 107 BPM | OXYGEN SATURATION: 100 % | TEMPERATURE: 98.6 F | WEIGHT: 172.6 LBS | DIASTOLIC BLOOD PRESSURE: 80 MMHG | SYSTOLIC BLOOD PRESSURE: 120 MMHG

## 2025-03-17 DIAGNOSIS — R53.83 OTHER FATIGUE: Primary | ICD-10-CM

## 2025-03-17 DIAGNOSIS — E66.3 OVERWEIGHT (BMI 25.0-29.9): ICD-10-CM

## 2025-03-17 NOTE — PROGRESS NOTES
Chief Complaint  Obesity    The PHQ has not been completed during this encounter.       Weight Management  Weight:  Unchanged  Weight loss treatment:  Prescription medications  Treatment barriers:  None  Physical activity tolerance:  Stable  Energy level:  Unchanged    Latisha Vogt is a 45 y.o. female who presents to [facility] with a past medical history of  Past Medical History:   Diagnosis Date    Abnormal Pap smear of cervix     all normal at [facility] since 2013.  Pt unsure as to when had abn pap.    Disease of thyroid gland     Urogenital trichomoniasis        HPI     The patient is a 45-year-old female who presents for weight management, elevated cortisol levels, iron deficiency, hair thinning, and health maintenance.    She has experienced a weight loss of 3 pounds, which she attributes to her dietary changes, including the consumption of a protein shake instead of lunch today. She reports no adverse effects from her current medication regimen but has noticed pruritus at the injection site since last week. She has been on her current dose of Zepbound for 2 months and expresses a desire to increase the dosage as she perceives a decrease in her appetite, although she continues to consume full meals. She has been on Wegovy for 2 months.    She expresses concern about potential elevated cortisol levels, suspecting that they may be impeding her progress. She acknowledges the role of stress in increasing cortisol levels but feels unable to reduce her work commitments. She plans to resume her exercise routine at home.    She recalls a period of fatigue in 2020, during which she underwent extensive blood work. At that time, her iron levels were within the normal range but on the lower end, prompting her to self-administer iron supplements, which she found beneficial. She currently takes half a pill daily, as a full pill every other day resulted in gastrointestinal upset. She has been on  a break from Criptext for approximately 3 to 4 weeks due to illness and subsequent recovery. She reports an improvement in her protein intake, currently consuming around 80 grams per day, up from a previous intake of 30 to 60 grams. She aims to include protein in each meal but does not consume breakfast.    She has observed hair thinning, which she speculates may be due to cortisol levels. She reports a significant improvement in her fatigue levels compared to the past.    She plans to schedule a mammogram in the summer.    MEDICATIONS  Current: iron supplements, Zepbound, Wegovy       Objective   Vital Signs:   Vitals:    03/17/25 1516   BP: 120/80   Pulse: 107   Temp: 98.6 °F (37 °C)   SpO2: 100%   Weight: 78.3 kg (172 lb 9.6 oz)   PainSc: 0-No pain     Body mass index is 27.44 kg/m².    Wt Readings from Last 3 Encounters:   03/17/25 78.3 kg (172 lb 9.6 oz)   02/17/25 79.4 kg (175 lb)   01/20/25 82.1 kg (181 lb 1.6 oz)     BP Readings from Last 3 Encounters:   03/17/25 120/80   02/17/25 116/70   01/20/25 126/78       Health Maintenance   Topic Date Due    MAMMOGRAM  Never done    HEPATITIS C SCREENING  Never done    ANNUAL PHYSICAL  Never done    COLORECTAL CANCER SCREENING  Never done    INFLUENZA VACCINE  03/31/2025 (Originally 7/1/2024)    COVID-19 Vaccine (3 - 2024-25 season) 12/20/2025 (Originally 9/1/2024)    TDAP/TD VACCINES (2 - Td or Tdap) 10/21/2025    PAP SMEAR  02/22/2026    BMI FOLLOWUP  03/17/2026    Pneumococcal Vaccine 0-49  Aged Out       Physical Exam  Vitals reviewed.   Constitutional:       General: She is not in acute distress.     Appearance: Normal appearance. She is well-developed.   HENT:      Head: Normocephalic and atraumatic.      Right Ear: External ear normal.      Left Ear: External ear normal.   Eyes:      Conjunctiva/sclera: Conjunctivae normal.      Pupils: Pupils are equal, round, and reactive to light.   Cardiovascular:      Rate and Rhythm: Normal rate and regular rhythm.       Heart sounds: Normal heart sounds.   Pulmonary:      Effort: Pulmonary effort is normal.      Breath sounds: Normal breath sounds.   Musculoskeletal:      Cervical back: Neck supple.      Right lower leg: No edema.      Left lower leg: No edema.   Skin:     General: Skin is warm and dry.   Neurological:      Mental Status: She is alert and oriented to person, place, and time.   Psychiatric:         Mood and Affect: Mood and affect normal.         Behavior: Behavior normal.         Thought Content: Thought content normal.         Judgment: Judgment normal.            Result Review :  The following data was reviewed by: QASIM Jain on 03/17/2025:  Results  Laboratory Studies  Vitamin D level was 32. B12 was 412.     Common labs          9/3/2024    10:11   Common Labs   WBC 8.77    Hemoglobin 14.5    Hematocrit 43.4    Platelets 330    Total Cholesterol 195    Triglycerides 108    HDL Cholesterol 52    LDL Cholesterol  124    Hemoglobin A1C 5.20      TSH          9/3/2024    10:11   TSH   TSH 2.220        Procedures        Assessment and Plan   Diagnoses and all orders for this visit:    1. Other fatigue (Primary)  -     Cancel: Cortisol - AM; Future  -     Cancel: Vitamin D,25-Hydroxy; Future  -     Cancel: Vitamin B12 & Folate; Future  -     Cancel: Ferritin; Future  -     Cancel: Iron Profile; Future  -     Cortisol - AM; Future  -     Ferritin; Future  -     Iron Profile; Future  -     Vitamin B12 & Folate; Future  -     Vitamin D,25-Hydroxy; Future    2. Overweight (BMI 25.0-29.9)  -     Tirzepatide-Weight Management (ZEPBOUND) 10 MG/0.5ML solution auto-injector; Inject 0.5 mL under the skin into the appropriate area as directed 1 (One) Time Per Week.  Dispense: 2 mL; Refill: 1  -     Cancel: Cortisol - AM; Future  -     Cortisol - AM; Future         1. Weight management.  Her weight has decreased by an additional 3 pounds. She has been on current dose of Zepbound for 2 months and wishes to increase  the dose. The dosage of Zepbound will be escalated to 10 mg.    2. Elevated cortisol levels.  She reports symptoms consistent with high cortisol levels. A fasting cortisol level test will be ordered to assess her cortisol levels.    3. Iron deficiency.  She has been taking iron supplements, which has helped alleviate her fatigue. A ferritin and iron level test will be ordered to monitor her iron status.    4. Hair thinning.  She reports hair thinning, which may be related to nutritional deficiencies. A comprehensive vitamin panel, including B12, folate, and vitamin D, will be ordered to evaluate her nutritional status.    5. Health maintenance.  She needs to schedule her mammogram, preferably during the summer when she has more availability.    Follow-up  The patient will follow up in 1 month.               FOLLOW UP  Return in about 4 weeks (around 4/14/2025).    Patient was given instructions and counseling regarding her condition or for health maintenance advice. Please see specific information pulled into the AVS if appropriate.       QASIM Jain  03/17/25  23:05 EDT    CURRENT & DISCONTINUED MEDICATIONS  Current Outpatient Medications   Medication Instructions    Tirzepatide-Weight Management (ZEPBOUND) 10 mg, Subcutaneous, Weekly       Medications Discontinued During This Encounter   Medication Reason    Tirzepatide-Weight Management (ZEPBOUND) 7.5 MG/0.5ML solution auto-injector         Patient or patient representative verbalized consent for the use of Ambient Listening during the visit with  QASIM Jain for chart documentation. 3/17/2025  15:30 EDT

## 2025-04-01 ENCOUNTER — PATIENT MESSAGE (OUTPATIENT)
Dept: OBSTETRICS AND GYNECOLOGY | Age: 45
End: 2025-04-01
Payer: OTHER GOVERNMENT

## 2025-04-14 ENCOUNTER — OFFICE VISIT (OUTPATIENT)
Dept: FAMILY MEDICINE CLINIC | Facility: CLINIC | Age: 45
End: 2025-04-14
Payer: OTHER GOVERNMENT

## 2025-04-14 VITALS
BODY MASS INDEX: 27.59 KG/M2 | HEART RATE: 93 BPM | WEIGHT: 175.8 LBS | DIASTOLIC BLOOD PRESSURE: 82 MMHG | SYSTOLIC BLOOD PRESSURE: 118 MMHG | HEIGHT: 67 IN | TEMPERATURE: 98.4 F | OXYGEN SATURATION: 100 %

## 2025-04-14 DIAGNOSIS — E66.3 OVERWEIGHT (BMI 25.0-29.9): ICD-10-CM

## 2025-04-14 NOTE — PROGRESS NOTES
Chief Complaint  Obesity (1 month follow up )    The PHQ has not been completed during this encounter.       Weight Management  Weight:  Unchanged  Weight loss treatment:  Prescription medications  Physical activity tolerance:  Worse  Energy level:  Decreased    Latisha Vogt is a 45 y.o. female who presents to [facility] with a past medical history of  Past Medical History:   Diagnosis Date    Abnormal Pap smear of cervix     all normal at [facility] since 2013.  Pt unsure as to when had abn pap.    Disease of thyroid gland     Urogenital trichomoniasis        HPI     The patient is a 45-year-old female who presents for follow-up on medication-assisted weight loss.    She reports that her weight has remained stable, with no significant changes observed on her home scale. She attributes this to her menstrual cycle, which she believes influences her appetite and weight. She recalls an episode of increased food intake last week, which she suspects may be related to her impending menstruation. Despite not engaging in binge eating, she acknowledges a heightened sense of hunger, particularly when her menstrual cycle is approaching. She also notes that she occasionally consumes more than one serving during meals. Her physical activity includes inconsistent strength training at home and attempts to incorporate cardiovascular exercises using an Echo bike machine.    She experiences mood fluctuations and increased irritability in the week leading up to her period, which she finds challenging to manage. She describes herself as a non-emotional individual except for the week leading up to her menstrual cycle and these symptoms have intensified with age.    MEDICATIONS  Current: Zepbound       Objective   Vital Signs:   Vitals:    04/14/25 1459   BP: 118/82   BP Location: Left arm   Patient Position: Sitting   Pulse: 93   Temp: 98.4 °F (36.9 °C)   SpO2: 100%   Weight: 79.7 kg (175 lb 12.8 oz)  "  Height: 168.9 cm (66.5\")   PainSc: 0-No pain     Body mass index is 27.95 kg/m².    Wt Readings from Last 3 Encounters:   04/14/25 79.7 kg (175 lb 12.8 oz)   03/17/25 78.3 kg (172 lb 9.6 oz)   02/17/25 79.4 kg (175 lb)     BP Readings from Last 3 Encounters:   04/14/25 118/82   03/17/25 120/80   02/17/25 116/70       Health Maintenance   Topic Date Due    MAMMOGRAM  Never done    HEPATITIS C SCREENING  Never done    ANNUAL PHYSICAL  Never done    COLORECTAL CANCER SCREENING  Never done    COVID-19 Vaccine (3 - 2024-25 season) 12/20/2025 (Originally 9/1/2024)    INFLUENZA VACCINE  07/01/2025    TDAP/TD VACCINES (2 - Td or Tdap) 10/21/2025    PAP SMEAR  02/22/2026    Pneumococcal Vaccine 0-49  Aged Out       Physical Exam  Vitals reviewed.   Constitutional:       General: She is not in acute distress.     Appearance: Normal appearance. She is well-developed.   HENT:      Head: Normocephalic and atraumatic.      Right Ear: External ear normal.      Left Ear: External ear normal.   Eyes:      Conjunctiva/sclera: Conjunctivae normal.      Pupils: Pupils are equal, round, and reactive to light.   Cardiovascular:      Rate and Rhythm: Normal rate and regular rhythm.      Heart sounds: Normal heart sounds.   Pulmonary:      Effort: Pulmonary effort is normal.      Breath sounds: Normal breath sounds.   Musculoskeletal:      Cervical back: Neck supple.      Right lower leg: No edema.      Left lower leg: No edema.   Skin:     General: Skin is warm and dry.   Neurological:      Mental Status: She is alert and oriented to person, place, and time.   Psychiatric:         Mood and Affect: Mood and affect normal.         Behavior: Behavior normal.         Thought Content: Thought content normal.         Judgment: Judgment normal.            Result Review :  The following data was reviewed by: QASIM Jain on 04/14/2025:  Results       Common labs          9/3/2024    10:11   Common Labs   WBC 8.77    Hemoglobin 14.5  "   Hematocrit 43.4    Platelets 330    Total Cholesterol 195    Triglycerides 108    HDL Cholesterol 52    LDL Cholesterol  124    Hemoglobin A1C 5.20      TSH          9/3/2024    10:11   TSH   TSH 2.220        Procedures        Assessment and Plan   Diagnoses and all orders for this visit:    1. Overweight (BMI 25.0-29.9)  -     Tirzepatide-Weight Management (ZEPBOUND) 10 MG/0.5ML solution auto-injector; Inject 0.5 mL under the skin into the appropriate area as directed 1 (One) Time Per Week.  Dispense: 2 mL; Refill: 1         1. Medication-assisted weight loss.  She has experienced a weight gain of 3 pounds over the past month. She is advised to continue her high-protein diet and limit second servings. The current dosage of Zepbound will be maintained. She is instructed to send a message in about a week with her weight as measured on her home scale.    2. Premenstrual mood changes.  She reports significant mood changes and increased appetite coinciding with her menstrual cycle. PMDD was discussed as a potential diagnosis. Treatment options such as Zoloft and fluoxetine were mentioned. A handout with information on PMDD will be provided.    3. Laboratory tests.  Orders for vitamin, iron, ferritin, and cortisol levels have been placed. The cortisol level needs to be done fasting, preferably on a Saturday morning before 10 AM.    Follow-up  The patient will follow up in 1 month.               FOLLOW UP  Return in about 4 weeks (around 5/12/2025) for Recheck.    Patient was given instructions and counseling regarding her condition or for health maintenance advice. Please see specific information pulled into the AVS if appropriate.       Shanthi Elkins, QASIM  04/14/25  15:39 EDT    CURRENT & DISCONTINUED MEDICATIONS  Current Outpatient Medications   Medication Instructions    Tirzepatide-Weight Management (ZEPBOUND) 10 mg, Subcutaneous, Weekly       Medications Discontinued During This Encounter   Medication Reason     Tirzepatide-Weight Management (ZEPBOUND) 10 MG/0.5ML solution auto-injector Reorder        Patient or patient representative verbalized consent for the use of Ambient Listening during the visit with  QASIM Jain for chart documentation. 4/14/2025  15:22 EDT

## 2025-04-21 ENCOUNTER — PATIENT MESSAGE (OUTPATIENT)
Dept: FAMILY MEDICINE CLINIC | Facility: CLINIC | Age: 45
End: 2025-04-21
Payer: OTHER GOVERNMENT

## 2025-05-14 ENCOUNTER — PREP FOR SURGERY (OUTPATIENT)
Dept: OTHER | Facility: HOSPITAL | Age: 45
End: 2025-05-14
Payer: OTHER GOVERNMENT

## 2025-05-14 ENCOUNTER — CLINICAL SUPPORT (OUTPATIENT)
Dept: GASTROENTEROLOGY | Facility: CLINIC | Age: 45
End: 2025-05-14
Payer: OTHER GOVERNMENT

## 2025-05-14 ENCOUNTER — OFFICE VISIT (OUTPATIENT)
Dept: FAMILY MEDICINE CLINIC | Facility: CLINIC | Age: 45
End: 2025-05-14
Payer: OTHER GOVERNMENT

## 2025-05-14 VITALS
OXYGEN SATURATION: 100 % | DIASTOLIC BLOOD PRESSURE: 76 MMHG | WEIGHT: 173.9 LBS | BODY MASS INDEX: 27.29 KG/M2 | HEIGHT: 67 IN | TEMPERATURE: 98.2 F | HEART RATE: 101 BPM | SYSTOLIC BLOOD PRESSURE: 122 MMHG

## 2025-05-14 DIAGNOSIS — Z80.0 FAMILY HX OF COLON CANCER: ICD-10-CM

## 2025-05-14 DIAGNOSIS — E66.3 OVERWEIGHT (BMI 25.0-29.9): Primary | ICD-10-CM

## 2025-05-14 DIAGNOSIS — Z12.11 ENCOUNTER FOR SCREENING COLONOSCOPY: Primary | ICD-10-CM

## 2025-05-14 DIAGNOSIS — R53.83 OTHER FATIGUE: ICD-10-CM

## 2025-05-14 DIAGNOSIS — N92.6 IRREGULAR PERIODS: ICD-10-CM

## 2025-05-14 NOTE — PROGRESS NOTES
Chief Complaint  Obesity (Follow up )    The PHQ has not been completed during this encounter.       History of Present Illness  Latisha Vogt is a 45 y.o. female who presents to [facility] with a past medical history of  Past Medical History:   Diagnosis Date    Abnormal Pap smear of cervix     all normal at [facility] since 2013.  Pt unsure as to when had abn pap.    Disease of thyroid gland     Urogenital trichomoniasis        HPI     The patient is a 45-year-old female here to follow up on medication-assisted weight loss.    She reports no adverse effects from Zepbound 10 mg but has not observed any significant weight loss. Her weight fluctuates around an average of 173 pounds, with a decrease noted only when she limits her intake to one meal per day. She denies intense hunger pangs when consuming only one meal daily. She has attempted various dietary strategies, including Faster Way, but struggles with adherence. The most successful regimen was a 21-day paleo diet, resulting in a 6-pound weight loss and noticeable changes in body composition. She has also experimented with a carnivore diet but found it challenging due to her aversion to fatty meat. Despite these efforts, she has not observed any changes in her clothing fit or overall physical and mental well-being. She has been avoiding dairy products due to associated stomach discomfort, although she tolerates yogurt and cottage cheese well. She has previously undergone food allergy testing, which revealed sensitivities to almonds and tomatoes. She has not experienced significant bloating recently. She has expressed interest in increasing her Zepbound dosage and plans to focus on protein intake. She has also considered trying almond milk yogurt.    She reports experiencing brain fog, which she attributes to work-related stress, and fatigue, although less severe than in previous years. She suspects hormonal imbalances may be  "contributing to these symptoms. She has a history of irregular periods and PMDD and plans to discuss potential treatments, such as ablation or hysterectomy, with her OB-GYN. She also reports mood swings and occasional heavy menstrual bleeding with large clots. She has a history of low iron levels, for which she takes iron supplements, and believes this has improved her fatigue.    GYNECOLOGICAL HISTORY:  - Frequency and Flow: Irregular periods with occasional heavy bleeding and large clots       Objective   Vital Signs:   Vitals:    05/14/25 1513   BP: 122/76   BP Location: Left arm   Patient Position: Sitting   Pulse: 101   Temp: 98.2 °F (36.8 °C)   SpO2: 100%   Weight: 78.9 kg (173 lb 14.4 oz)   Height: 168.9 cm (66.5\")   PainSc: 0-No pain     Body mass index is 27.65 kg/m².    Wt Readings from Last 3 Encounters:   05/14/25 78.9 kg (173 lb 14.4 oz)   04/14/25 79.7 kg (175 lb 12.8 oz)   03/17/25 78.3 kg (172 lb 9.6 oz)     BP Readings from Last 3 Encounters:   05/14/25 122/76   04/14/25 118/82   03/17/25 120/80       Health Maintenance   Topic Date Due    MAMMOGRAM  Never done    HEPATITIS C SCREENING  Never done    ANNUAL PHYSICAL  Never done    COLORECTAL CANCER SCREENING  Never done    COVID-19 Vaccine (3 - 2024-25 season) 12/20/2025 (Originally 9/1/2024)    INFLUENZA VACCINE  07/01/2025    TDAP/TD VACCINES (2 - Td or Tdap) 10/21/2025    PAP SMEAR  02/22/2026    Pneumococcal Vaccine 0-49  Aged Out       Physical Exam  Vitals reviewed.   Constitutional:       General: She is not in acute distress.     Appearance: Normal appearance. She is well-developed.   HENT:      Head: Normocephalic and atraumatic.      Right Ear: External ear normal.      Left Ear: External ear normal.   Eyes:      Conjunctiva/sclera: Conjunctivae normal.      Pupils: Pupils are equal, round, and reactive to light.   Musculoskeletal:      Right lower leg: No edema.      Left lower leg: No edema.   Skin:     General: Skin is warm and dry. "   Neurological:      Mental Status: She is alert and oriented to person, place, and time.   Psychiatric:         Mood and Affect: Mood and affect normal.         Behavior: Behavior normal.         Thought Content: Thought content normal.         Judgment: Judgment normal.            Result Review :    Results         Procedures        Assessment and Plan   Diagnoses and all orders for this visit:    1. Overweight (BMI 25.0-29.9) (Primary)  -     Tirzepatide-Weight Management (ZEPBOUND) 12.5 MG/0.5ML solution auto-injector; Inject 0.5 mL under the skin into the appropriate area as directed 1 (One) Time Per Week.  Dispense: 2 mL; Refill: 1    2. Other fatigue  -     Testosterone, Free, Total; Future  -     TSH Rfx On Abnormal To Free T4; Future  -     FSH & LH; Future    3. Irregular periods  -     FSH & LH; Future         1. Weight management.  - Currently on Zepbound 10 mg but reports no significant weight loss.  - Experiences intense hunger pains when eating only one meal a day.  - Advised to increase protein intake and consider non-dairy Greek yogurts and gluten-free granola.  - Comprehensive blood work panel, including luteinizing hormone, follicle-stimulating hormone, thyroid level, and testosterone levels, has been ordered. Will inform the provider before having the blood work drawn. Dosage of Zepbound will be increased.    2. Hormonal imbalance.  - Reports symptoms of brain fog and fatigue, which may be related to hormonal imbalances.  - History of irregular periods and PMDD.  - Advised to try ashwagandha root for anxiety and mood fluctuations, starting a week before her period.  - Comprehensive blood work panel, including luteinizing hormone, follicle-stimulating hormone, thyroid level, and testosterone levels, has been ordered to assess hormonal status.    Follow-up  - Follow up in 1 month.               FOLLOW UP  Return in about 4 weeks (around 6/11/2025) for Recheck.    Patient was given instructions and  counseling regarding her condition or for health maintenance advice. Please see specific information pulled into the AVS if appropriate.       AQSIM Jain  05/15/25  12:01 EDT    CURRENT & DISCONTINUED MEDICATIONS  Current Outpatient Medications   Medication Instructions    Sod Picosulfate-Mag Ox-Cit Acd (CLENPIQ) 10-3.5-12 MG-GM -GM/175ML solution 1 each, Oral, Take As Directed, Take per office instructions    Tirzepatide-Weight Management (ZEPBOUND) 12.5 mg, Subcutaneous, Weekly       Medications Discontinued During This Encounter   Medication Reason    Tirzepatide-Weight Management (ZEPBOUND) 10 MG/0.5ML solution auto-injector Dose adjustment        Patient or patient representative verbalized consent for the use of Ambient Listening during the visit with  QASIM Jain for chart documentation. 5/15/2025  15:46 EDT

## 2025-05-14 NOTE — PROGRESS NOTES
Latisha Vogt  1980  45 y.o.    Reason for call: Screening Colonoscopy, No Previous, fm hx colon cancer-PGM  If recall, please list diagnosis:   Prep prescribed: Clenpiq + extended bowel prep  Prep instructions reviewed with patient and sent to patient via [employer]  Is the patient currently on any injectable or oral medications for weight loss or diabetes? Yes  Clearance needed? No  If yes, what clearance is needed? N/A  Clearance has been requested from   The patient has been scheduled for: Colonoscopy     Latisha Vogt is aware they have been scheduled for a screening colonoscopy. Patient has expressed they are not having any symptoms at all.   Family history of colon cancer? Yes  If yes, indicate relative: PGM  Tentative Procedure Date: 6.23.2025    Date/Place of last Scope: none  Able to obtain report?   Family History   Problem Relation Age of Onset    Deep vein thrombosis Paternal Grandmother     Colon cancer Paternal Grandmother     Breast cancer Neg Hx     Ovarian cancer Neg Hx     Uterine cancer Neg Hx     Melanoma Neg Hx     Pulmonary embolism Neg Hx     Esophageal cancer Neg Hx     Liver cancer Neg Hx     Rectal cancer Neg Hx     Stomach cancer Neg Hx      Past Medical History:   Diagnosis Date    Abnormal Pap smear of cervix     all normal at [facility] since 2013.  Pt unsure as to when had abn pap.    Disease of thyroid gland     Urogenital trichomoniasis      No Known Allergies  Past Surgical History:   Procedure Laterality Date    EYE SURGERY  1/24    Lasik     Social History     Socioeconomic History    Marital status:    Tobacco Use    Smoking status: Never     Passive exposure: Never    Smokeless tobacco: Never   Vaping Use    Vaping status: Never Used   Substance and Sexual Activity    Alcohol use: Not Currently     Comment: SOCIALLY    Drug use: Never    Sexual activity: Yes     Partners: Male     Birth control/protection: Vasectomy       Current Outpatient Medications:     Tirzepatide-Weight  Management (ZEPBOUND) 10 MG/0.5ML solution auto-injector, Inject 0.5 mL under the skin into the appropriate area as directed 1 (One) Time Per Week., Disp: 2 mL, Rfl: 1

## 2025-05-15 ENCOUNTER — PATIENT MESSAGE (OUTPATIENT)
Dept: FAMILY MEDICINE CLINIC | Facility: CLINIC | Age: 45
End: 2025-05-15
Payer: OTHER GOVERNMENT

## 2025-05-15 DIAGNOSIS — R53.83 OTHER FATIGUE: ICD-10-CM

## 2025-05-15 DIAGNOSIS — N92.6 IRREGULAR PERIODS: Primary | ICD-10-CM

## 2025-06-05 ENCOUNTER — CLINICAL SUPPORT (OUTPATIENT)
Dept: FAMILY MEDICINE CLINIC | Facility: CLINIC | Age: 45
End: 2025-06-05
Payer: OTHER GOVERNMENT

## 2025-06-05 DIAGNOSIS — R53.83 OTHER FATIGUE: ICD-10-CM

## 2025-06-05 DIAGNOSIS — E66.3 OVERWEIGHT (BMI 25.0-29.9): ICD-10-CM

## 2025-06-05 DIAGNOSIS — N92.6 IRREGULAR PERIODS: ICD-10-CM

## 2025-06-05 NOTE — PROGRESS NOTES
..  Venipuncture Blood Specimen Collection  Venipuncture performed in Lt arm by Zari Constantino with good hemostasis. Patient tolerated the procedure well without complications.   06/05/25   Diana Knowles MA

## 2025-06-11 ENCOUNTER — RESULTS FOLLOW-UP (OUTPATIENT)
Dept: FAMILY MEDICINE CLINIC | Facility: CLINIC | Age: 45
End: 2025-06-11
Payer: OTHER GOVERNMENT

## 2025-06-11 DIAGNOSIS — R53.83 OTHER FATIGUE: ICD-10-CM

## 2025-06-11 DIAGNOSIS — N92.6 IRREGULAR PERIODS: ICD-10-CM

## 2025-06-11 DIAGNOSIS — E66.3 OVERWEIGHT (BMI 25.0-29.9): Primary | ICD-10-CM

## 2025-06-11 NOTE — PAT
Left message on voicemail with arrival time of      Come to [facility], entrance [address]. Bring picture ID and insurance card, have  over 18 to give ride home.     Bring medication list but do not take any meds except inhalers that morning. Bring medications with you to the hospital, including inhalers. Do not take zepbound for a full 7 days prior to procedure.     Complete prep prior to arrival. Clear liquids all day the day before, and start prep as instructed.     Complete prep and any water may need to drink at least 2 hours prior to arrival. No gum, mints, water, or anything else in mouth after that.      Plan to be here at least 3-4 hours. Do not bring any valuables with you to the hospital.     Call [phone] with any questions.

## 2025-06-11 NOTE — LETTER
Latisha Vogt  [address] [place] [address]    June 12, 2025     Dear Ms. Vogt:    Latisha, your labs were all normal. Your hormone levels were all in normal range of a normal menstruating female.     If you have any questions or concerns, please don't hesitate to call.         Sincerely,        QASIM Jain

## 2025-06-13 NOTE — PAT
Reviewed the following with patient.    Arrival time of 0730.    Must have  over 18 for transportation home post procedure. Come to [facility], entrance [address].     Education provided on laxative administration; bowel prep to be taken in two doses. Reviewed diet instructions for day prior to procedure. Only plain, unflavored water after midnight until two hours prior to arrival time.     Do not take any morning medications on the day of the procedure. Instead bring all prescribed medication and inhalers to the hospital the morning of the procedure. May take any nebulizer treatments or inhalers the morning of the procedure. Hold zepbound for a full 7 days.     Plan to be here 3-4 hours.   Do not bring any valuables to hospital with you. Call Endo department for any questions.     Pt verbalized understanding of instructions.

## 2025-06-17 ENCOUNTER — TELEPHONE (OUTPATIENT)
Dept: GASTROENTEROLOGY | Facility: CLINIC | Age: 45
End: 2025-06-17
Payer: OTHER GOVERNMENT

## 2025-06-17 ENCOUNTER — OFFICE VISIT (OUTPATIENT)
Dept: FAMILY MEDICINE CLINIC | Facility: CLINIC | Age: 45
End: 2025-06-17
Payer: OTHER GOVERNMENT

## 2025-06-17 ENCOUNTER — PATIENT MESSAGE (OUTPATIENT)
Dept: FAMILY MEDICINE CLINIC | Facility: CLINIC | Age: 45
End: 2025-06-17

## 2025-06-17 VITALS
HEART RATE: 99 BPM | OXYGEN SATURATION: 99 % | DIASTOLIC BLOOD PRESSURE: 72 MMHG | SYSTOLIC BLOOD PRESSURE: 128 MMHG | BODY MASS INDEX: 26.93 KG/M2 | WEIGHT: 171.6 LBS | HEIGHT: 67 IN | TEMPERATURE: 98.4 F

## 2025-06-17 DIAGNOSIS — E66.3 OVERWEIGHT (BMI 25.0-29.9): Primary | ICD-10-CM

## 2025-06-17 PROCEDURE — 99213 OFFICE O/P EST LOW 20 MIN: CPT | Performed by: NURSE PRACTITIONER

## 2025-06-17 NOTE — PROGRESS NOTES
"Chief Complaint  Weight Check    The PHQ has not been completed during this encounter.       History of Present Illness  Latisha Vogt is a 45 y.o. female who presents to [facility] with a past medical history of  Past Medical History:   Diagnosis Date    Abnormal Pap smear of cervix     all normal at [facility] since 2013.  Pt unsure as to when had abn pap.    Disease of thyroid gland     Urogenital trichomoniasis        HPI     The patient is a 45-year-old female here to follow up on medically assisted weight loss.    She has been experiencing weight loss, which she attributes to a decreased appetite. Prior to her vacation, she inadvertently missed her injection and is unable to receive it this week due to a scheduled colonoscopy on 06/23/2025. She reports no adverse effects from the 12.5 mg dosage. Her last recorded weight was 168.8 pounds on 06/05/2025. She believes that her current weight is satisfactory and plans to resume her injections post-colonoscopy. She has been adhering to a high-protein diet and notes an improvement in her overall well-being, with less fatigue than before. She is not currently engaged in any exercise regimen but is considering alternatives such as Pilates.    She has not yet undergone a mammogram, citing difficulty in finding a provider covered by her insurance.    She expresses interest in understanding her vitamin D and iron levels. She also reports experiencing brain fog.       Objective   Vital Signs:   Vitals:    06/17/25 1257   BP: 128/72   BP Location: Left arm   Patient Position: Sitting   Pulse: 99   Temp: 98.4 °F (36.9 °C)   SpO2: 99%   Weight: 77.8 kg (171 lb 9.6 oz)   Height: 168.9 cm (66.5\")   PainSc: 0-No pain     Body mass index is 27.28 kg/m².    Wt Readings from Last 3 Encounters:   06/17/25 77.8 kg (171 lb 9.6 oz)   05/14/25 78.9 kg (173 lb 14.4 oz)   04/14/25 79.7 kg (175 lb 12.8 oz)     BP Readings from Last 3 Encounters:   06/17/25 128/72 "   05/14/25 122/76   04/14/25 118/82       Health Maintenance   Topic Date Due    MAMMOGRAM  Never done    HEPATITIS C SCREENING  Never done    ANNUAL PHYSICAL  Never done    COLORECTAL CANCER SCREENING  Never done    COVID-19 Vaccine (3 - 2024-25 season) 12/20/2025 (Originally 9/1/2024)    INFLUENZA VACCINE  07/01/2025    TDAP/TD VACCINES (2 - Td or Tdap) 10/21/2025    PAP SMEAR  02/22/2026    Pneumococcal Vaccine 0-49  Aged Out       Physical Exam  Vitals reviewed.   Constitutional:       General: She is not in acute distress.     Appearance: Normal appearance. She is well-developed.   HENT:      Head: Normocephalic and atraumatic.      Right Ear: External ear normal.      Left Ear: External ear normal.   Eyes:      Conjunctiva/sclera: Conjunctivae normal.      Pupils: Pupils are equal, round, and reactive to light.   Musculoskeletal:      Right lower leg: No edema.      Left lower leg: No edema.   Skin:     General: Skin is warm and dry.   Neurological:      Mental Status: She is alert and oriented to person, place, and time.   Psychiatric:         Mood and Affect: Mood and affect normal.         Behavior: Behavior normal.         Thought Content: Thought content normal.         Judgment: Judgment normal.            Result Review :  The following data was reviewed by: QASIM Jain on 06/17/2025:  Results  Labs   - Thyroid level: 1.24   - Total iron: 114   - Iron binding capacity: 327   - Ferritin: 77   - B12: 482   - Folate level: 14.5   - Estradiol level: Normal   - Cortisol level: Normal   - Testosterone: Normal     Common labs          9/3/2024    10:11   Common Labs   WBC 8.77    Hemoglobin 14.5    Hematocrit 43.4    Platelets 330    Total Cholesterol 195    Triglycerides 108    HDL Cholesterol 52    LDL Cholesterol  124    Hemoglobin A1C 5.20      TSH          9/3/2024    10:11   TSH   TSH 2.220        Procedures        Assessment and Plan   Diagnoses and all orders for this visit:    1. Overweight  (BMI 25.0-29.9) (Primary)  -     Tirzepatide-Weight Management (ZEPBOUND) 10 MG/0.5ML solution auto-injector; Inject 0.5 mL under the skin into the appropriate area as directed 1 (One) Time Per Week.  Dispense: 2 mL; Refill: 0  -     Tirzepatide-Weight Management (ZEPBOUND) 12.5 MG/0.5ML solution auto-injector; Inject 0.5 mL under the skin into the appropriate area as directed 1 (One) Time Per Week.  Dispense: 2 mL; Refill: 1         1. Medically assisted weight loss.  - Experienced a weight loss of 2 pounds since the last visit, even without the administration of the 12.5 mg injection.  - Labs were reviewed and were within normal limits.  - Discussed the importance of maintaining a high-protein diet and considering lower intensity workouts like Pilates.  - Prescription for 10 mg issued, with instructions to take 1 to 2 doses before reverting to the 12.5 mg dosage. Refills for both the 12.5 mg and 10 mg provided.    2. Health maintenance.  - Scheduled for a colonoscopy on 06/23/2025.  - Encouraged to schedule a mammogram and find a provider covered by her insurance.  - Discussed the difficulty in finding a covered provider and the potential cost implications.    3. Vitamin B12 deficiency.  - B12 level is 482 pg/mL, which is within the normal range but close to the lower limit.  - Reports experiencing brain fog, which may be related to her B12 levels.  - Advised to start a B12 supplement to address these symptoms.    Follow-up  - Follow-up appointment scheduled in 6 weeks.               FOLLOW UP  Return in about 6 weeks (around 7/29/2025) for Recheck, Refill.    Patient was given instructions and counseling regarding her condition or for health maintenance advice. Please see specific information pulled into the AVS if appropriate.       Shanthi Elkins, QASIM  06/17/25  14:12 EDT    CURRENT & DISCONTINUED MEDICATIONS  Current Outpatient Medications   Medication Instructions    Sod Picosulfate-Mag Ox-Cit Acd  (CLENPIQ) 10-3.5-12 MG-GM -GM/175ML solution 1 each, Oral, Take As Directed, Take per office instructions    Tirzepatide-Weight Management (ZEPBOUND) 10 mg, Subcutaneous, Weekly    Tirzepatide-Weight Management (ZEPBOUND) 12.5 mg, Subcutaneous, Weekly       Medications Discontinued During This Encounter   Medication Reason    Tirzepatide-Weight Management (ZEPBOUND) 12.5 MG/0.5ML solution auto-injector Reorder        Patient or patient representative verbalized consent for the use of Ambient Listening during the visit with  QASIM Jain for chart documentation. 6/17/2025  13:06 EDT

## 2025-06-17 NOTE — TELEPHONE ENCOUNTER
ENDO RECONCILIATION  Verify source of procedure(s): Nurse/MA screening 6/2025  If other, please list source: referral from GYN for colon screening  TIME OUT-CONFIRM CORRECT PROCEDURE: Colonoscopy  Cardiology: none  Pulmonology: none  Blood thinner: none  GLP-1: Zepbound  Additional DX/indication for procedure: colon screening, no previous, Fm Hx Colon Cancer- PGM  Please include any other notes relevant to endo reconciliation: N/A

## 2025-06-20 ENCOUNTER — ANESTHESIA EVENT (OUTPATIENT)
Dept: GASTROENTEROLOGY | Facility: HOSPITAL | Age: 45
End: 2025-06-20
Payer: OTHER GOVERNMENT

## 2025-06-20 NOTE — ANESTHESIA PREPROCEDURE EVALUATION
Anesthesia Evaluation     Nursing notes reviewed   NPO Solid Status: > 8 hours  NPO Liquid Status: > 2 hours           Airway   Mallampati: I  TM distance: >3 FB  Neck ROM: full  No difficulty expected  Dental      Pulmonary     breath sounds clear to auscultation  Cardiovascular     Rhythm: regular  Rate: normal        Neuro/Psych  GI/Hepatic/Renal/Endo    (+) thyroid problem     Musculoskeletal     Abdominal    Substance History      OB/GYN          Other          Other Comment: Lasik 1/2024   ROS/Med Hx Other: Pt instructed to hold Zepbound x 7 days per PAT note - last dose 6/2/25     HCG neg 6/23                     Anesthesia Plan    ASA 1     general     (Total IV Anesthesia    Patient understands anesthesia not responsible for dental damage. Discussed risks with pt including aspiration, allergic reactions, apnea, advanced airway placement. Pt verbalized understanding. All questions answered.   )  intravenous induction     Anesthetic plan, risks, benefits, and alternatives have been provided, discussed and informed consent has been obtained with: patient.    Plan discussed with CRNA.        CODE STATUS:

## 2025-06-23 ENCOUNTER — HOSPITAL ENCOUNTER (OUTPATIENT)
Facility: HOSPITAL | Age: 45
Setting detail: HOSPITAL OUTPATIENT SURGERY
Discharge: HOME | End: 2025-06-23
Attending: INTERNAL MEDICINE
Payer: OTHER GOVERNMENT

## 2025-06-23 ENCOUNTER — ANESTHESIA (OUTPATIENT)
Dept: GASTROENTEROLOGY | Facility: HOSPITAL | Age: 45
End: 2025-06-23
Payer: OTHER GOVERNMENT

## 2025-06-23 VITALS
TEMPERATURE: 97.7 F | BODY MASS INDEX: 28.01 KG/M2 | RESPIRATION RATE: 19 BRPM | WEIGHT: 176.15 LBS | HEART RATE: 69 BPM | DIASTOLIC BLOOD PRESSURE: 71 MMHG | SYSTOLIC BLOOD PRESSURE: 106 MMHG | OXYGEN SATURATION: 98 %

## 2025-06-23 DIAGNOSIS — Z80.0 FAMILY HX OF COLON CANCER: ICD-10-CM

## 2025-06-23 DIAGNOSIS — Z12.11 ENCOUNTER FOR SCREENING COLONOSCOPY: ICD-10-CM

## 2025-06-23 LAB — B-HCG UR QL: NEGATIVE

## 2025-06-23 PROCEDURE — 45385 COLONOSCOPY W/LESION REMOVAL: CPT | Performed by: INTERNAL MEDICINE

## 2025-06-23 PROCEDURE — 45381 COLONOSCOPY SUBMUCOUS NJX: CPT | Performed by: INTERNAL MEDICINE

## 2025-06-23 PROCEDURE — 45380 COLONOSCOPY AND BIOPSY: CPT | Performed by: INTERNAL MEDICINE

## 2025-06-23 PROCEDURE — 81025 URINE PREGNANCY TEST: CPT

## 2025-06-23 PROCEDURE — 25010000002 GLUCAGON (RDNA) PER 1 MG: Performed by: NURSE ANESTHETIST, CERTIFIED REGISTERED

## 2025-06-23 PROCEDURE — 25010000002 LIDOCAINE PF 2% 2 % SOLUTION: Performed by: NURSE ANESTHETIST, CERTIFIED REGISTERED

## 2025-06-23 PROCEDURE — 88305 TISSUE EXAM BY PATHOLOGIST: CPT | Performed by: INTERNAL MEDICINE

## 2025-06-23 PROCEDURE — 25010000002 PROPOFOL 10 MG/ML EMULSION: Performed by: NURSE ANESTHETIST, CERTIFIED REGISTERED

## 2025-06-23 PROCEDURE — 25810000003 LACTATED RINGERS PER 1000 ML: Performed by: NURSE ANESTHETIST, CERTIFIED REGISTERED

## 2025-06-23 RX ORDER — LIDOCAINE HYDROCHLORIDE 20 MG/ML
INJECTION, SOLUTION EPIDURAL; INFILTRATION; INTRACAUDAL; PERINEURAL AS NEEDED
Status: DISCONTINUED | OUTPATIENT
Start: 2025-06-23 | End: 2025-06-23 | Stop reason: SURG

## 2025-06-23 RX ORDER — SODIUM CHLORIDE, SODIUM LACTATE, POTASSIUM CHLORIDE, CALCIUM CHLORIDE 600; 310; 30; 20 MG/100ML; MG/100ML; MG/100ML; MG/100ML
30 INJECTION, SOLUTION INTRAVENOUS CONTINUOUS
Status: DISCONTINUED | OUTPATIENT
Start: 2025-06-23 | End: 2025-06-23 | Stop reason: HOSPADM

## 2025-06-23 RX ORDER — IBUPROFEN 600 MG/1
TABLET ORAL AS NEEDED
Status: DISCONTINUED | OUTPATIENT
Start: 2025-06-23 | End: 2025-06-23 | Stop reason: SURG

## 2025-06-23 RX ORDER — PROPOFOL 10 MG/ML
VIAL (ML) INTRAVENOUS AS NEEDED
Status: DISCONTINUED | OUTPATIENT
Start: 2025-06-23 | End: 2025-06-23 | Stop reason: SURG

## 2025-06-23 RX ADMIN — PROPOFOL 50 MG: 10 INJECTION, EMULSION INTRAVENOUS at 08:55

## 2025-06-23 RX ADMIN — SODIUM CHLORIDE, POTASSIUM CHLORIDE, SODIUM LACTATE AND CALCIUM CHLORIDE 30 ML/HR: 600; 310; 30; 20 INJECTION, SOLUTION INTRAVENOUS at 08:20

## 2025-06-23 RX ADMIN — PROPOFOL 275 MCG/KG/MIN: 10 INJECTION, EMULSION INTRAVENOUS at 08:44

## 2025-06-23 RX ADMIN — GLUCAGON 0.5 MG: KIT at 08:52

## 2025-06-23 RX ADMIN — PROPOFOL 50 MG: 10 INJECTION, EMULSION INTRAVENOUS at 08:46

## 2025-06-23 RX ADMIN — PROPOFOL 50 MG: 10 INJECTION, EMULSION INTRAVENOUS at 08:48

## 2025-06-23 RX ADMIN — PROPOFOL 50 MG: 10 INJECTION, EMULSION INTRAVENOUS at 08:44

## 2025-06-23 RX ADMIN — LIDOCAINE HYDROCHLORIDE 60 MG: 20 INJECTION, SOLUTION EPIDURAL; INFILTRATION; INTRACAUDAL; PERINEURAL at 08:44

## 2025-06-23 NOTE — ANESTHESIA POSTPROCEDURE EVALUATION
Patient: Latisha Vogt    Procedure Summary       Date: 06/23/25 Room / Location: [facility] ENDOSCOPY 2 / [facility] ENDOSCOPY    Anesthesia Start: 0843 Anesthesia Stop: 0916    Procedure: COLONOSCOPY WITH ELEVIEW INJECTION AND POLYPECTOMY Diagnosis:       Encounter for screening colonoscopy      Family hx of colon cancer      (Encounter for screening colonoscopy [Z12.11])      (Family hx of colon cancer [Z80.0])    Surgeons: Reji Ritchie MD Provider: Donnie Naik CRNA    Anesthesia Type: general ASA Status: 1            Anesthesia Type: general    Vitals  Vitals Value Taken Time   /71 06/23/25 09:28   Temp 36.5 °C (97.7 °F) 06/23/25 09:28   Pulse 69 06/23/25 09:28   Resp 19 06/23/25 09:28   SpO2 98 % 06/23/25 09:28           Post Anesthesia Care and Evaluation    Post-procedure mental status: acceptable.  Pain management: satisfactory to patient    Airway patency: patent  Anesthetic complications: No anesthetic complications    Cardiovascular status: acceptable  Respiratory status: acceptable    Comments: Per chart review

## 2025-06-23 NOTE — H&P
03/05/21    Patient received Entyvio infusion on Saturday 02/27/21 for colitis.  Has COVID-19 vaccination scheduled for 03/09/21.  Questioning if COVID vaccination needs to be rescheduled.    Per [employer]:   \"Non-live vaccines may be given concurrently with Entyvio.\"  \"COVID-19 Vaccine (mRNA): Immunosuppressants may diminish the therapeutic effect of COVID-19 Vaccine (mRNA). Risk C: Monitor therapy\".    CDC web site only address whether vaccine should be given to persons given monoclonal antibody therapy for COVID, not other illnesses, stating :  \"Can COVID-19 vaccine be given to persons who received monoclonal antibodies or convalescent plasma for treatment of COVID-19?  Yes, but vaccination should be deferred for at least 90 days after they received treatment. Based on the estimated half-life of monoclonal antibodies or convalescent plasma as part of COVID-19 treatment, as well as evidence suggesting that reinfection is uncommon in the 90 days after initial infection, delaying vaccination for 90 days is a precautionary measure until additional information becomes available, to avoid interference of the antibody treatment with vaccine-induced immune responses.\"    Patient made aware of above; verbalized understanding. Writer advised that patient contact his GI Specialist, Dr Esqueda, on Monday 03/08/21 for instruction on whether to proceed with vaccination as planned or reschedule. Patient/Caller agreeable to this; verbalized understanding.  Patient denied having further questions or concerns at this time. Patient encouraged to call back for change in condition. Or further questions/concerns.      Reason for Disposition  • Caller has medication question about med not prescribed by PCP and triager unable to answer question (e.g., compatibility with other med, storage)    Protocols used: MEDICATION QUESTION CALL-A-       ScreeningPre Procedure History & Physical    Chief Complaint:   Screening     Subjective     HPI:   Screening     Past Medical History:   Past Medical History:   Diagnosis Date    Abnormal Pap smear of cervix     all normal at [facility] since 2013.  Pt unsure as to when had abn pap.    Disease of thyroid gland     Urogenital trichomoniasis        Past Surgical History:  Past Surgical History:   Procedure Laterality Date    EYE SURGERY  1/24    Nabil       Family History:  Family History   Problem Relation Age of Onset    Deep vein thrombosis Paternal Grandmother     Colon cancer Paternal Grandmother     Breast cancer Neg Hx     Ovarian cancer Neg Hx     Uterine cancer Neg Hx     Melanoma Neg Hx     Pulmonary embolism Neg Hx     Esophageal cancer Neg Hx     Liver cancer Neg Hx     Rectal cancer Neg Hx     Stomach cancer Neg Hx        Social History:   reports that she has never smoked. She has never been exposed to tobacco smoke. She has never used smokeless tobacco. She reports that she does not currently use alcohol. She reports that she does not use drugs.    Medications:   Medications Prior to Admission   Medication Sig Dispense Refill Last Dose/Taking    Tirzepatide-Weight Management (ZEPBOUND) 10 MG/0.5ML solution auto-injector Inject 0.5 mL under the skin into the appropriate area as directed 1 (One) Time Per Week. 2 mL 0 6/2/2025       Allergies:  Patient has no known allergies.        Objective     Blood pressure 115/78, pulse 63, temperature 98.6 °F (37 °C), temperature source Temporal, resp. rate 11, weight 79.9 kg (176 lb 2.4 oz), SpO2 98%.    Physical Exam   Constitutional: Pt is oriented to person, place, and time and well-developed, well-nourished, and in no distress.   Mouth/Throat: Oropharynx is clear and moist.   Neck: Normal range of motion.   Cardiovascular: Normal rate, regular rhythm and normal heart sounds.    Pulmonary/Chest: Effort normal and breath sounds normal.   Abdominal: Soft.  Nontender  Skin: Skin is warm and dry.   Psychiatric: Mood, memory, affect and judgment normal.     Assessment & Plan     Diagnosis:  Screening colonoscopy       Anticipated Surgical Procedure:  colonoscopy    The risks, benefits, and alternatives of this procedure have been discussed with the patient or the responsible party- the patient understands and agrees to proceed.

## 2025-06-24 LAB
CYTO UR: NORMAL
LAB AP CASE REPORT: NORMAL
LAB AP CLINICAL INFORMATION: NORMAL
PATH REPORT.FINAL DX SPEC: NORMAL
PATH REPORT.GROSS SPEC: NORMAL

## 2025-07-28 ENCOUNTER — OFFICE VISIT (OUTPATIENT)
Dept: FAMILY MEDICINE CLINIC | Facility: CLINIC | Age: 45
End: 2025-07-28
Payer: OTHER GOVERNMENT

## 2025-07-28 VITALS
OXYGEN SATURATION: 100 % | HEIGHT: 67 IN | HEART RATE: 104 BPM | SYSTOLIC BLOOD PRESSURE: 108 MMHG | BODY MASS INDEX: 27.17 KG/M2 | WEIGHT: 173.1 LBS | DIASTOLIC BLOOD PRESSURE: 64 MMHG | TEMPERATURE: 97.7 F

## 2025-07-28 DIAGNOSIS — E66.3 OVERWEIGHT (BMI 25.0-29.9): ICD-10-CM

## 2025-07-28 PROCEDURE — 99213 OFFICE O/P EST LOW 20 MIN: CPT | Performed by: NURSE PRACTITIONER

## 2025-07-28 NOTE — PROGRESS NOTES
Chief Complaint  Weight Loss (PT PRESENTS FOR WEIGHT LOSS CHECK UP TODAY)    Little interest or pleasure in doing things? Not at all   Feeling down, depressed, or hopeless? Not at all   PHQ-2 Total Score 0        History of Present Illness  Latisha Vogt is a 45 y.o. female who presents to [facility] with a past medical history of  Past Medical History:   Diagnosis Date    Abnormal Pap smear of cervix     all normal at [facility] since 2013.  Pt unsure as to when had abn pap.    Disease of thyroid gland     Urogenital trichomoniasis        HPI     The patient is a 45-year-old female here to follow up on medication-assisted weight loss.    She has been taking Mounjaro 10 mg, which has made a noticeable difference in her appetite. Over the past 6 weeks, she has reduced her food intake to 1 or 2 meals per day. She took the medication on Wednesday and experienced sulfur burps after consuming spicy ramen and eggs on Thursday, which persisted until Friday. She believes this side effect was due to administering the medication in her stomach instead of her thigh. She has previously tolerated an increased dose of the medication well. She has been on the 12.5 mg dose for over a month and a half and feels that reducing her food intake is the only way she can lose weight. She continues to consume a high-protein diet when she eats. She engages in strength training exercises at home but acknowledges a loss of muscle mass as she can no longer perform certain physical activities such as jumping and pulling herself up (jumping pull-up). She reports no lightheadedness or dizziness.    She had a colonoscopy done recently, during which a couple of small polyps were found. There was also a growth on her appendix that was tested and found to be benign. She needs to go back in a year for a follow-up colonoscopy.    Diet: Reduced food intake to 1-2 meals per day, high-protein diet    FAMILY HISTORY  Her  "grandmother had colon cancer.       Objective   Vital Signs:   Vitals:    07/28/25 1114   BP: 108/64   BP Location: Left arm   Patient Position: Sitting   Cuff Size: Large Adult   Pulse: 104   Temp: 97.7 °F (36.5 °C)   TempSrc: Temporal   SpO2: 100%   Weight: 78.5 kg (173 lb 1.6 oz)   Height: 168.9 cm (66.5\")   PainSc: 0-No pain     Body mass index is 27.52 kg/m².    Wt Readings from Last 3 Encounters:   07/28/25 78.5 kg (173 lb 1.6 oz)   06/23/25 79.9 kg (176 lb 2.4 oz)   06/17/25 77.8 kg (171 lb 9.6 oz)     BP Readings from Last 3 Encounters:   07/28/25 108/64   06/23/25 106/71   06/17/25 128/72       Health Maintenance   Topic Date Due    HEPATITIS C SCREENING  Never done    ANNUAL PHYSICAL  Never done    MAMMOGRAM  08/29/2025 (Originally 1/26/2020)    COVID-19 Vaccine (3 - 2024-25 season) 12/20/2025 (Originally 9/1/2024)    INFLUENZA VACCINE  10/01/2025    TDAP/TD VACCINES (2 - Td or Tdap) 10/21/2025    PAP SMEAR  02/22/2026    COLORECTAL CANCER SCREENING  06/23/2026    Pneumococcal Vaccine 0-49  Aged Out       Physical Exam  Vitals reviewed.   Constitutional:       General: She is not in acute distress.     Appearance: Normal appearance. She is well-developed.   HENT:      Head: Normocephalic and atraumatic.      Right Ear: External ear normal.      Left Ear: External ear normal.   Eyes:      Conjunctiva/sclera: Conjunctivae normal.      Pupils: Pupils are equal, round, and reactive to light.   Musculoskeletal:      Right lower leg: No edema.      Left lower leg: No edema.   Skin:     General: Skin is warm and dry.   Neurological:      Mental Status: She is alert and oriented to person, place, and time.   Psychiatric:         Mood and Affect: Mood and affect normal.         Behavior: Behavior normal.         Thought Content: Thought content normal.         Judgment: Judgment normal.            Result Review :  The following data was reviewed by: QASIM Jain on 07/28/2025:  Results  Diagnostic " Testing   - Colonoscopy: A couple of small polyps were found, including a growth on the appendix. All were benign.     Common labs          9/3/2024    10:11   Common Labs   WBC 8.77    Hemoglobin 14.5    Hematocrit 43.4    Platelets 330    Total Cholesterol 195    Triglycerides 108    HDL Cholesterol 52    LDL Cholesterol  124    Hemoglobin A1C 5.20      TSH          9/3/2024    10:11   TSH   TSH 2.220        Procedures        Assessment and Plan   Diagnoses and all orders for this visit:    1. Overweight (BMI 25.0-29.9)  -     Tirzepatide-Weight Management (ZEPBOUND) 12.5 MG/0.5ML solution auto-injector; Inject 0.5 mL under the skin into the appropriate area as directed 1 (One) Time Per Week.  Dispense: 6 mL; Refill: 1         1. Weight management.  - Her weight has decreased by an additional 3 pounds, with a reduction in BMI from 28 to 27.5 since the last visit.  - Blood pressure readings are within normal range.  - She is advised to maintain a high-protein diet and continue with strength training exercises.  - The dosage of Mounjaro will be increased to 12.5 mg, which she has already started. A refill prescription will be provided to ensure she has enough medication for the next 4 weeks.    2. Colon polyp.  - She had a colonoscopy that revealed a couple of small polyps, including one on her appendix, which were benign.  - Given her family history of colon cancer, a repeat colonoscopy is recommended in 1 year to monitor for any changes.    Follow-up: A follow-up visit is scheduled in 6 weeks.               FOLLOW UP  Return in about 6 weeks (around 9/8/2025) for Recheck.    Patient was given instructions and counseling regarding her condition or for health maintenance advice. Please see specific information pulled into the AVS if appropriate.       Shanthi Elkins, APRN  07/28/25  13:12 EDT    CURRENT & DISCONTINUED MEDICATIONS  Current Outpatient Medications   Medication Instructions    Tirzepatide-Weight  Management (ZEPBOUND) 12.5 mg, Subcutaneous, Weekly       Medications Discontinued During This Encounter   Medication Reason    Tirzepatide-Weight Management (ZEPBOUND) 10 MG/0.5ML solution auto-injector         Patient or patient representative verbalized consent for the use of Ambient Listening during the visit with  QASIM Jain for chart documentation. 7/28/2025  11:34 EDT

## 2025-08-27 ENCOUNTER — PATIENT MESSAGE (OUTPATIENT)
Dept: FAMILY MEDICINE CLINIC | Facility: CLINIC | Age: 45
End: 2025-08-27
Payer: OTHER GOVERNMENT

## 2025-08-27 DIAGNOSIS — E66.3 OVERWEIGHT (BMI 25.0-29.9): ICD-10-CM

## (undated) DEVICE — SNAR POLYP CAPTIFLEX XS/OVL 11X2.4MM 240CM 1P/U

## (undated) DEVICE — Device

## (undated) DEVICE — SOL IRRG H2O PL/BG 1000ML STRL

## (undated) DEVICE — LINER SURG CANSTR SXN S/RIGD 1500CC

## (undated) DEVICE — THE STERILE LIGHT HANDLE COVER IS USED WITH STERIS SURGICAL LIGHTING AND VISUALIZATION SYSTEMS.

## (undated) DEVICE — SOLIDIFIER LIQLOC PLS 1500CC BT

## (undated) DEVICE — CONN JET HYDRA H20 AUXILIARY DISP

## (undated) DEVICE — DEFENDO AIR WATER SUCTION AND BIOPSY VALVE KIT FOR  OLYMPUS: Brand: DEFENDO AIR/WATER/SUCTION AND BIOPSY VALVE

## (undated) DEVICE — SINGLE-USE BIOPSY FORCEPS: Brand: RADIAL JAW 4

## (undated) DEVICE — INJ SUB/MUCUS ELEVIEW FOR/GI/ENDO/PROC AMPL/10ML

## (undated) DEVICE — Device: Brand: SINGLE USE INJECTOR NM600/610

## (undated) DEVICE — Device: Brand: DEFENDO AIR/WATER/SUCTION AND BIOPSY VALVE